# Patient Record
Sex: MALE | Race: ASIAN | Employment: FULL TIME | URBAN - METROPOLITAN AREA
[De-identification: names, ages, dates, MRNs, and addresses within clinical notes are randomized per-mention and may not be internally consistent; named-entity substitution may affect disease eponyms.]

---

## 2017-06-15 ENCOUNTER — OFFICE VISIT - HEALTHEAST (OUTPATIENT)
Dept: FAMILY MEDICINE | Facility: CLINIC | Age: 42
End: 2017-06-15

## 2017-06-15 ENCOUNTER — RECORDS - HEALTHEAST (OUTPATIENT)
Dept: GENERAL RADIOLOGY | Age: 42
End: 2017-06-15

## 2017-06-15 DIAGNOSIS — M25.512 PAIN IN LEFT SHOULDER: ICD-10-CM

## 2017-06-15 DIAGNOSIS — M25.512 ACUTE PAIN OF LEFT SHOULDER: ICD-10-CM

## 2017-06-19 ENCOUNTER — COMMUNICATION - HEALTHEAST (OUTPATIENT)
Dept: SCHEDULING | Facility: CLINIC | Age: 42
End: 2017-06-19

## 2017-06-20 ENCOUNTER — COMMUNICATION - HEALTHEAST (OUTPATIENT)
Dept: INTERNAL MEDICINE | Facility: CLINIC | Age: 42
End: 2017-06-20

## 2020-03-19 ENCOUNTER — HOSPITAL ENCOUNTER (INPATIENT)
Facility: CLINIC | Age: 45
LOS: 6 days | Discharge: HOME | DRG: 392 | End: 2020-03-26
Attending: EMERGENCY MEDICINE | Admitting: HOSPITALIST
Payer: COMMERCIAL

## 2020-03-19 ENCOUNTER — APPOINTMENT (OUTPATIENT)
Dept: CT IMAGING | Facility: CLINIC | Age: 45
DRG: 392 | End: 2020-03-19
Attending: EMERGENCY MEDICINE
Payer: COMMERCIAL

## 2020-03-19 DIAGNOSIS — K57.32 DIVERTICULITIS OF COLON: ICD-10-CM

## 2020-03-19 DIAGNOSIS — K57.92 DIVERTICULITIS: Primary | ICD-10-CM

## 2020-03-19 LAB
ALBUMIN SERPL-MCNC: 3.8 G/DL (ref 3.4–5)
ALBUMIN UR-MCNC: NEGATIVE MG/DL
ALP SERPL-CCNC: 85 U/L (ref 40–150)
ALT SERPL W P-5'-P-CCNC: 43 U/L (ref 0–70)
ANION GAP SERPL CALCULATED.3IONS-SCNC: 5 MMOL/L (ref 3–14)
APPEARANCE UR: CLEAR
AST SERPL W P-5'-P-CCNC: 28 U/L (ref 0–45)
BASOPHILS # BLD AUTO: 0 10E9/L (ref 0–0.2)
BASOPHILS NFR BLD AUTO: 0 %
BILIRUB SERPL-MCNC: 0.8 MG/DL (ref 0.2–1.3)
BILIRUB UR QL STRIP: NEGATIVE
BUN SERPL-MCNC: 16 MG/DL (ref 7–30)
CALCIUM SERPL-MCNC: 9.7 MG/DL (ref 8.5–10.1)
CHLORIDE SERPL-SCNC: 109 MMOL/L (ref 94–109)
CO2 SERPL-SCNC: 26 MMOL/L (ref 20–32)
COLOR UR AUTO: YELLOW
CREAT SERPL-MCNC: 1.29 MG/DL (ref 0.66–1.25)
DIFFERENTIAL METHOD BLD: ABNORMAL
EOSINOPHIL # BLD AUTO: 0 10E9/L (ref 0–0.7)
EOSINOPHIL NFR BLD AUTO: 0 %
ERYTHROCYTE [DISTWIDTH] IN BLOOD BY AUTOMATED COUNT: 13.2 % (ref 10–15)
GFR SERPL CREATININE-BSD FRML MDRD: 67 ML/MIN/{1.73_M2}
GLUCOSE SERPL-MCNC: 137 MG/DL (ref 70–99)
GLUCOSE UR STRIP-MCNC: NEGATIVE MG/DL
HCT VFR BLD AUTO: 46.2 % (ref 40–53)
HGB BLD-MCNC: 15.7 G/DL (ref 13.3–17.7)
HGB UR QL STRIP: NEGATIVE
IMM GRANULOCYTES # BLD: 0 10E9/L (ref 0–0.4)
IMM GRANULOCYTES NFR BLD: 0.2 %
KETONES UR STRIP-MCNC: 10 MG/DL
LACTATE BLD-SCNC: 1.2 MMOL/L (ref 0.7–2)
LACTATE BLD-SCNC: 1.3 MMOL/L (ref 0.7–2)
LEUKOCYTE ESTERASE UR QL STRIP: NEGATIVE
LIPASE SERPL-CCNC: 143 U/L (ref 73–393)
LYMPHOCYTES # BLD AUTO: 1.1 10E9/L (ref 0.8–5.3)
LYMPHOCYTES NFR BLD AUTO: 10.8 %
MCH RBC QN AUTO: 30.1 PG (ref 26.5–33)
MCHC RBC AUTO-ENTMCNC: 34 G/DL (ref 31.5–36.5)
MCV RBC AUTO: 89 FL (ref 78–100)
MONOCYTES # BLD AUTO: 0.5 10E9/L (ref 0–1.3)
MONOCYTES NFR BLD AUTO: 5.2 %
MUCOUS THREADS #/AREA URNS LPF: PRESENT /LPF
NEUTROPHILS # BLD AUTO: 8.5 10E9/L (ref 1.6–8.3)
NEUTROPHILS NFR BLD AUTO: 83.8 %
NITRATE UR QL: NEGATIVE
PH UR STRIP: 6 PH (ref 5–7)
PLATELET # BLD AUTO: 199 10E9/L (ref 150–450)
POTASSIUM SERPL-SCNC: 3.5 MMOL/L (ref 3.4–5.3)
PROT SERPL-MCNC: 7.7 G/DL (ref 6.8–8.8)
RBC # BLD AUTO: 5.22 10E12/L (ref 4.4–5.9)
RBC #/AREA URNS AUTO: <1 /HPF (ref 0–2)
SODIUM SERPL-SCNC: 140 MMOL/L (ref 133–144)
SOURCE: ABNORMAL
SP GR UR STRIP: 1.03 (ref 1–1.03)
UROBILINOGEN UR STRIP-MCNC: NORMAL MG/DL (ref 0–2)
WBC # BLD AUTO: 10.2 10E9/L (ref 4–11)
WBC #/AREA URNS AUTO: <1 /HPF (ref 0–5)

## 2020-03-19 PROCEDURE — 25000128 H RX IP 250 OP 636: Performed by: HOSPITALIST

## 2020-03-19 PROCEDURE — 96375 TX/PRO/DX INJ NEW DRUG ADDON: CPT

## 2020-03-19 PROCEDURE — 25800030 ZZH RX IP 258 OP 636: Performed by: HOSPITALIST

## 2020-03-19 PROCEDURE — G0378 HOSPITAL OBSERVATION PER HR: HCPCS

## 2020-03-19 PROCEDURE — 25000128 H RX IP 250 OP 636: Performed by: EMERGENCY MEDICINE

## 2020-03-19 PROCEDURE — 80053 COMPREHEN METABOLIC PANEL: CPT | Performed by: EMERGENCY MEDICINE

## 2020-03-19 PROCEDURE — 36415 COLL VENOUS BLD VENIPUNCTURE: CPT | Performed by: HOSPITALIST

## 2020-03-19 PROCEDURE — 25000128 H RX IP 250 OP 636

## 2020-03-19 PROCEDURE — 83605 ASSAY OF LACTIC ACID: CPT | Performed by: HOSPITALIST

## 2020-03-19 PROCEDURE — 83605 ASSAY OF LACTIC ACID: CPT | Performed by: EMERGENCY MEDICINE

## 2020-03-19 PROCEDURE — 81001 URINALYSIS AUTO W/SCOPE: CPT | Performed by: EMERGENCY MEDICINE

## 2020-03-19 PROCEDURE — 99285 EMERGENCY DEPT VISIT HI MDM: CPT | Mod: 25

## 2020-03-19 PROCEDURE — 25000132 ZZH RX MED GY IP 250 OP 250 PS 637: Performed by: HOSPITALIST

## 2020-03-19 PROCEDURE — 99220 ZZC INITIAL OBSERVATION CARE,LEVL III: CPT | Performed by: HOSPITALIST

## 2020-03-19 PROCEDURE — 74177 CT ABD & PELVIS W/CONTRAST: CPT

## 2020-03-19 PROCEDURE — 25000125 ZZHC RX 250: Performed by: EMERGENCY MEDICINE

## 2020-03-19 PROCEDURE — 96365 THER/PROPH/DIAG IV INF INIT: CPT | Mod: 59

## 2020-03-19 PROCEDURE — 12000000 ZZH R&B MED SURG/OB

## 2020-03-19 PROCEDURE — 85025 COMPLETE CBC W/AUTO DIFF WBC: CPT | Performed by: EMERGENCY MEDICINE

## 2020-03-19 PROCEDURE — 25800030 ZZH RX IP 258 OP 636: Performed by: PHYSICIAN ASSISTANT

## 2020-03-19 PROCEDURE — 83690 ASSAY OF LIPASE: CPT | Performed by: EMERGENCY MEDICINE

## 2020-03-19 PROCEDURE — 96376 TX/PRO/DX INJ SAME DRUG ADON: CPT

## 2020-03-19 RX ORDER — HYDROMORPHONE HYDROCHLORIDE 1 MG/ML
0.5 INJECTION, SOLUTION INTRAMUSCULAR; INTRAVENOUS; SUBCUTANEOUS
Status: DISCONTINUED | OUTPATIENT
Start: 2020-03-19 | End: 2020-03-19

## 2020-03-19 RX ORDER — NALOXONE HYDROCHLORIDE 0.4 MG/ML
.1-.4 INJECTION, SOLUTION INTRAMUSCULAR; INTRAVENOUS; SUBCUTANEOUS
Status: DISCONTINUED | OUTPATIENT
Start: 2020-03-19 | End: 2020-03-26 | Stop reason: HOSPADM

## 2020-03-19 RX ORDER — HYDROMORPHONE HYDROCHLORIDE 1 MG/ML
0.3 INJECTION, SOLUTION INTRAMUSCULAR; INTRAVENOUS; SUBCUTANEOUS
Status: DISCONTINUED | OUTPATIENT
Start: 2020-03-19 | End: 2020-03-26 | Stop reason: HOSPADM

## 2020-03-19 RX ORDER — PIPERACILLIN SODIUM, TAZOBACTAM SODIUM 3; .375 G/15ML; G/15ML
3.38 INJECTION, POWDER, LYOPHILIZED, FOR SOLUTION INTRAVENOUS ONCE
Status: COMPLETED | OUTPATIENT
Start: 2020-03-19 | End: 2020-03-19

## 2020-03-19 RX ORDER — ONDANSETRON 2 MG/ML
4 INJECTION INTRAMUSCULAR; INTRAVENOUS EVERY 6 HOURS PRN
Status: DISCONTINUED | OUTPATIENT
Start: 2020-03-19 | End: 2020-03-26 | Stop reason: HOSPADM

## 2020-03-19 RX ORDER — HYDROCODONE BITARTRATE AND ACETAMINOPHEN 5; 325 MG/1; MG/1
1-2 TABLET ORAL EVERY 4 HOURS PRN
Status: DISCONTINUED | OUTPATIENT
Start: 2020-03-19 | End: 2020-03-26 | Stop reason: HOSPADM

## 2020-03-19 RX ORDER — CEFTRIAXONE 1 G/1
1 INJECTION, POWDER, FOR SOLUTION INTRAMUSCULAR; INTRAVENOUS EVERY 24 HOURS
Status: DISCONTINUED | OUTPATIENT
Start: 2020-03-19 | End: 2020-03-26 | Stop reason: HOSPADM

## 2020-03-19 RX ORDER — IOPAMIDOL 755 MG/ML
71 INJECTION, SOLUTION INTRAVASCULAR ONCE
Status: COMPLETED | OUTPATIENT
Start: 2020-03-19 | End: 2020-03-19

## 2020-03-19 RX ORDER — ACETAMINOPHEN 325 MG/1
650 TABLET ORAL EVERY 4 HOURS PRN
Status: DISCONTINUED | OUTPATIENT
Start: 2020-03-19 | End: 2020-03-26 | Stop reason: HOSPADM

## 2020-03-19 RX ORDER — HYDROMORPHONE HYDROCHLORIDE 1 MG/ML
INJECTION, SOLUTION INTRAMUSCULAR; INTRAVENOUS; SUBCUTANEOUS
Status: COMPLETED
Start: 2020-03-19 | End: 2020-03-19

## 2020-03-19 RX ORDER — SODIUM CHLORIDE, SODIUM LACTATE, POTASSIUM CHLORIDE, CALCIUM CHLORIDE 600; 310; 30; 20 MG/100ML; MG/100ML; MG/100ML; MG/100ML
INJECTION, SOLUTION INTRAVENOUS CONTINUOUS
Status: DISCONTINUED | OUTPATIENT
Start: 2020-03-19 | End: 2020-03-26 | Stop reason: HOSPADM

## 2020-03-19 RX ORDER — ONDANSETRON 4 MG/1
4 TABLET, ORALLY DISINTEGRATING ORAL EVERY 6 HOURS PRN
Status: DISCONTINUED | OUTPATIENT
Start: 2020-03-19 | End: 2020-03-26 | Stop reason: HOSPADM

## 2020-03-19 RX ORDER — ACETAMINOPHEN 650 MG/1
650 SUPPOSITORY RECTAL EVERY 4 HOURS PRN
Status: DISCONTINUED | OUTPATIENT
Start: 2020-03-19 | End: 2020-03-26 | Stop reason: HOSPADM

## 2020-03-19 RX ADMIN — SODIUM CHLORIDE, POTASSIUM CHLORIDE, SODIUM LACTATE AND CALCIUM CHLORIDE: 600; 310; 30; 20 INJECTION, SOLUTION INTRAVENOUS at 14:54

## 2020-03-19 RX ADMIN — METRONIDAZOLE 500 MG: 500 INJECTION, SOLUTION INTRAVENOUS at 20:18

## 2020-03-19 RX ADMIN — METRONIDAZOLE 500 MG: 500 INJECTION, SOLUTION INTRAVENOUS at 11:59

## 2020-03-19 RX ADMIN — METRONIDAZOLE 500 MG: 500 INJECTION, SOLUTION INTRAVENOUS at 05:24

## 2020-03-19 RX ADMIN — IOPAMIDOL 71 ML: 755 INJECTION, SOLUTION INTRAVENOUS at 01:41

## 2020-03-19 RX ADMIN — ACETAMINOPHEN 650 MG: 325 TABLET, FILM COATED ORAL at 18:42

## 2020-03-19 RX ADMIN — HYDROMORPHONE HYDROCHLORIDE 0.5 MG: 1 INJECTION, SOLUTION INTRAMUSCULAR; INTRAVENOUS; SUBCUTANEOUS at 01:11

## 2020-03-19 RX ADMIN — ACETAMINOPHEN 650 MG: 325 TABLET, FILM COATED ORAL at 11:13

## 2020-03-19 RX ADMIN — SODIUM CHLORIDE 1000 ML: 9 INJECTION, SOLUTION INTRAVENOUS at 03:51

## 2020-03-19 RX ADMIN — CEFTRIAXONE SODIUM 1 G: 1 INJECTION, POWDER, FOR SOLUTION INTRAMUSCULAR; INTRAVENOUS at 04:52

## 2020-03-19 RX ADMIN — HYDROCODONE BITARTRATE AND ACETAMINOPHEN 1 TABLET: 5; 325 TABLET ORAL at 14:53

## 2020-03-19 RX ADMIN — SODIUM CHLORIDE 60 ML: 9 INJECTION, SOLUTION INTRAVENOUS at 01:41

## 2020-03-19 RX ADMIN — HYDROCODONE BITARTRATE AND ACETAMINOPHEN 1 TABLET: 5; 325 TABLET ORAL at 03:56

## 2020-03-19 RX ADMIN — PIPERACILLIN SODIUM AND TAZOBACTAM SODIUM 3.38 G: 3; .375 INJECTION, POWDER, LYOPHILIZED, FOR SOLUTION INTRAVENOUS at 02:24

## 2020-03-19 RX ADMIN — HYDROCODONE BITARTRATE AND ACETAMINOPHEN 2 TABLET: 5; 325 TABLET ORAL at 18:41

## 2020-03-19 ASSESSMENT — MIFFLIN-ST. JEOR
SCORE: 1456.9
SCORE: 1467.79

## 2020-03-19 ASSESSMENT — ENCOUNTER SYMPTOMS
DIARRHEA: 0
NAUSEA: 0
VOMITING: 0
BACK PAIN: 0
ABDOMINAL PAIN: 1

## 2020-03-19 NOTE — ED PROVIDER NOTES
"  History     Chief Complaint:  Abdominal Pain      HPI   Elier Godinez is a 45 year old male who presents via EMS with abdominal pain. The patient says that he has been having constant abdominal pain since 1900. He rates the pain as a 8/10. EMS reports giving the patient Fentanyl. The patient says that the pain feels similar to when he had appendicitis in the past. He denies any nausea, vomiting, diarrhea or back pain.     Allergies:  No Known Drug Allergies    Medications:    Medications reviewed. No current medications.     Past Medical History:    Medical history reviewed. No pertinent medical history.    Past Surgical History:    Appendectomy     Family History:    Family history reviewed. No pertinent family history.     Social History:  PCP: Thom Jiménez   Marital Status:  Single      Review of Systems   Gastrointestinal: Positive for abdominal pain. Negative for diarrhea, nausea and vomiting.   Musculoskeletal: Negative for back pain.   10 point review of systems performed and is negative except as above and in HPI.       Physical Exam     Patient Vitals for the past 24 hrs:   BP Temp Temp src Pulse Heart Rate Resp SpO2 Height Weight   03/19/20 0441 -- -- -- -- -- 20 -- -- --   03/19/20 0341 -- -- -- -- -- -- -- -- 62.4 kg (137 lb 9.6 oz)   03/19/20 0340 102/61 99.3  F (37.4  C) Oral 100 -- 20 94 % -- --   03/19/20 0230 114/85 -- -- 103 104 -- -- -- --   03/19/20 0201 120/88 -- -- 109 108 28 96 % -- --   03/19/20 0200 120/88 -- -- 109 109 22 96 % -- --   03/19/20 0130 113/79 -- -- 102 104 21 96 % -- --   03/19/20 0100 -- -- -- -- 73 -- 99 % -- --   03/19/20 0057 117/79 98.3  F (36.8  C) Oral 71 -- 18 99 % 1.676 m (5' 6\") 63.5 kg (140 lb)   03/19/20 0056 117/79 98.5  F (36.9  C) Oral 73 -- -- 99 % -- --        Physical Exam  General: Resting on the gurney, appears  uncomfortable  Head:  The scalp, face, and head appear normal  Mouth/Throat: Mucus membranes are moist  CV:  Regular rate    Normal S1 and " S2  No pathological murmur   Resp:  Breath sounds clear and equal bilaterally    Non-labored, no retractions or accessory muscle use    No coarseness    No wheezing   GI:  Abdomen is soft, no rigidity    diffuse tenderness throughout. Voluntary guarding, rebound present, pain worse in lower abdomen.  MS:  Normal motor assessment of all extremities.    Good capillary refill noted.      Skin:   No rash or lesions noted.  Neuro:  Speech is normal and fluent. No apparent deficit.  Psych:  Awake. Alert.  Normal affect.      Appropriate interactions.   Emergency Department Course     Imaging:  Radiology findings were communicated with the patient who voiced understanding of the findings.    CT Abdomen Pelvis w Contrast  1.  Evidence for acute sigmoid diverticulitis with microperforation. No abscess at this time. Adjacent loops of small bowel likely secondarily inflamed. No obstruction. If not performed per routine screening guidelines, GI or surgical consult recommended   to consider colonoscopy and exclude other underlying pathology.  Reading per radiology    Laboratory:  Laboratory findings were communicated with the patient who voiced understanding of the findings.    UA: ketone 10 (A), mucous present (A) o/w WNL     Lactic Acid (Resulted: 0118): 1.2   CBC: WBC 10.2, HGB 15.7,   CMP:  (H), creatinine 1.29 (H) o/w WNL  Lipase: 143    Interventions:  0111 Dilaudid 0.5 mg IV   0224 Zosyn 3.375 g IV     Emergency Department Course:    0055 Nursing notes and vitals reviewed. I performed an exam of the patient as documented above.     0105 IV was inserted and blood was drawn for laboratory testing, results above.     0149 The patient was sent for a CT while in the emergency department, results above.      0204 I spoke with Radiology regarding patient's presentation, findings, and plan of care.     0226 The patient provided a urine sample here in the emergency department. This was sent for laboratory testing,  findings above.     0251 I spoke with Dr. Holland of the hospitalist service regarding patient's presentation, findings, and plan of care.      Patient rechecked and updated.       The patient is admitted into the care of Dr. Holland.     Impression & Plan      Medical Decision Making:  Elier Godinez is a 45 year old male presented with abdominal pain and CT confirms diverticulitis with microperforation; this appears consistent with the history and physical exam so I doubt another underlying etiology is also present.  The patient's pain has been controlled by interventions in the emergency department.  There are no signs of sepsis, shock or bacteremia at this time.  Antibiotics were started here in the ED. I discussed the patient with the hospitalist and they agreed for admission to an observation bed for further pain management and evaluation.     Diagnosis:    ICD-10-CM    1. Diverticulitis of colon  K57.32 UA with Microscopic     Disposition:   Findings and plan explained to the Patient who consents to admission. Discussed the patient with Dr. Holland, who will admit the patient for further monitoring, evaluation, and treatment.       Scribe Disclosure:  REX, Garrick Richards, am serving as a scribe at 12:57 AM on 3/19/2020 to document services personally performed by Amie De Jesus MD based on my observations and the provider's statements to me.       EMERGENCY DEPARTMENT       Amie De Jesus MD  03/19/20 8715

## 2020-03-19 NOTE — PROGRESS NOTES
Observation Goals      -diagnostic tests and consults completed and resulted - not met, possible GI consult if not tolerating food   -vital signs normal or at patient baseline - met   -tolerating oral intake to maintain hydration - partially met - Full liquid diet   -adequate pain control on oral analgesics - met with PRNs   Nurse to notify provider when observation goals have been met and patient is ready for discharge.

## 2020-03-19 NOTE — PROGRESS NOTES
RECEIVING UNIT ED HANDOFF REVIEW    ED Nurse Handoff Report was reviewed by: Elaine Munoz RN on March 19, 2020 at 3:17 AM

## 2020-03-19 NOTE — H&P
[facility]     History and Physical - Hospitalist Service         Name: Elier Godinez    MRN: [id number]  YOB: 1975    Age: 45 year old  Date of Admission:  3/19/2020  Physician: Dorina Holland MD  Text Page        Assessment & Plan   Elier Godinez is a 45 year old male with PMH chronic latent hep B who presents on 03/19/20  with acute diverticulitis with microperforation.    Acute sigmoid diverticulitis with microperforation: no abscess noted  - Received IV Zosyn x 1 in ED. Will continue with Ceftriaxone and flagyl.   - IV hydration  - pain control  - clear diet. ADAT  - GI inpatient consult if pt does not improve as expected, otherwise f/u outpatient    Acute kidney injury: presumed 2/2 dehydration, poor po intake  - IV fluids  - avoid nephrotoxins    Chronic latent HBV: presumed vertical transmission. Recently diagnosed. Follows with GI.  - continue outpatient follow up    Diet: Orders Placed This Encounter      Advance Diet as Tolerated: Clear Liquid Diet     DVT Prophylaxis: Pneumatic Compression Devices  Code Status: Full Code  Aldana Catheter: not present      Disposition Plan    Expected discharge: Tomorrow, recommended to prior living arrangement once adequate pain management/ tolerating PO medications and antibiotic plan established.    Dorina Holland MD  03/19/2020 2:54 AM       Primary Care Physician   *Thom Jiménez, [phone]    Chief Complaint   Severe abdominal pain    History is obtained from the patient.  I also spoke with the ER provider about the history.       History of Present Illness   Elier Godinez is a 45 year old male who presents with abdominal pain that began at 7pm yesterday evening. 9/10 in intensity when it started, intermittent, but frequent. Currently 2/10 after pain medications in the ED. Last po intake was lunch - [employer]. He has had occasional pain of this type before, but not to this degree. Denies fevers or chills. No nausea, vomiting  or diarrhea.    Past Medical History    Chronic hepatitis B    Past Surgical History   Lipoma excisions  Appendectomy  Left hip surgery    Prior to Admission Medications   Occasional ibuprofen    Allergies   No Known Allergies    Social History   Vapes occasionally. Denies alcohol or other drug use.    Family History   Mother has Hepatitis B    Review of Systems   A Comprehensive greater than 10 system review of systems was carried out.  Pertinent positives and negatives are noted above.  Otherwise negative for contributory information.    Physical Exam   Temp: 98.3  F (36.8  C) Temp src: Oral BP: 114/85 Pulse: 103 Heart Rate: 104 Resp: 28 SpO2: 96 % O2 Device: None (Room air)    Vital Signs with Ranges  Temp:  [98.3  F (36.8  C)-98.5  F (36.9  C)] 98.3  F (36.8  C)  Pulse:  [] 103  Heart Rate:  [] 104  Resp:  [18-28] 28  BP: (113-120)/(79-88) 114/85  SpO2:  [96 %-99 %] 96 %  140 lbs 0 oz    GEN:  Alert, oriented x 3, appears comfortable, no overt distress  HEENT:  Normocephalic/atraumatic, no scleral icterus, no nasal discharge, mouth moist.  CV:  Tachycardic  LUNGS:  Clear to auscultation bilaterally without rales/rhonchi/wheezing/retractions.  Symmetric chest rise on inhalation noted.  ABD:  Soft. Tender to palp  EXT:  No edema.  No cyanosis.  No joint synovitis noted.  SKIN:  Dry to touch, no exanthems noted in the visualized areas.  NEURO:  Symmetric muscle strength, sensation to touch grossly intact.  Coordination symmetric on general exam.  No new focal deficits appreciated.    Data   Data reviewed today:  I personally reviewed no images or EKG's today.    Lab Results   Component Value Date    WBC 10.2 03/19/2020    HGB 15.7 03/19/2020    HCT 46.2 03/19/2020     03/19/2020     03/19/2020    POTASSIUM 3.5 03/19/2020    CHLORIDE 109 03/19/2020    CO2 26 03/19/2020    BUN 16 03/19/2020    CR 1.29 (H) 03/19/2020     (H) 03/19/2020    AST 28 03/19/2020    ALT 43 03/19/2020     ALKPHOS 85 03/19/2020    BILITOTAL 0.8 03/19/2020        Recent Results (from the past 24 hour(s))   CT Abdomen Pelvis w Contrast    Narrative    EXAM: CT ABDOMEN PELVIS W CONTRAST  LOCATION: [facility]  DATE/TIME: 3/19/2020 1:49 AM    INDICATION: Abdominal pain with rebound tenderness.  COMPARISON: None.  TECHNIQUE: CT scan of the abdomen and pelvis was performed following injection of IV contrast. Multiplanar reformats were obtained. Dose reduction techniques were used.  CONTRAST: 71 mL Isovue-370.    FINDINGS:   LOWER CHEST: Normal.    HEPATOBILIARY: Normal.    PANCREAS: Normal.    SPLEEN: Normal.    ADRENAL GLANDS: Normal.    KIDNEYS/BLADDER: Tiny renal lesions too small to characterize definitively though likely benign.    BOWEL: Diverticulosis with sigmoid colon wall thickening, adjacent inflammatory change and small bubbles of extraluminal gas consistent with perforated diverticulitis. Inflammatory changes involve adjacent loops of small bowel. No significant abscess at   this time. No bowel obstruction.    LYMPH NODES: Normal.    VASCULATURE: Unremarkable.    PELVIC ORGANS: Normal.    MUSCULOSKELETAL: Tiny fat-containing paraumbilical hernia. Degenerative disease. Presumed bone islands.      Impression    IMPRESSION:   1.  Evidence for acute sigmoid diverticulitis with microperforation. No abscess at this time. Adjacent loops of small bowel likely secondarily inflamed. No obstruction. If not performed per routine screening guidelines, GI or surgical consult recommended   to consider colonoscopy and exclude other underlying pathology.    Results called to Amie De Jesus at 2:04 AM.

## 2020-03-19 NOTE — PROGRESS NOTES
[facility]    Medicine Progress Note - Hospitalist Service       Date of Admission:  3/19/2020  Assessment & Plan   Elier Godinez is a 45 year old male with PMH chronic latent hep B who presents on 03/19/20 with acute diverticulitis with microperforation.     Acute sigmoid diverticulitis with microperforation:   Presented with abd pain x1 day PTA. No N/V/D. No fevers chills. Found to have diverticulitis on imaging. Denies prior hx of diverticulitis per pt. CT with acute sigmoid diverticulitis with microperforation. No abscess noted.   - Received IV Zosyn x 1 in ED. Will continue with Ceftriaxone and flagyl.   - IV hydration  - Pain control  - Clear diet, ADAT  - GI inpatient consult if pt does not improve within 24' as expected, otherwise f/u outpatient     Acute kidney injury  Cr 1.29  - Presumed 2/2 dehydration, poor po intake.   - IV fluids and avoid nephrotoxins  - UA neg  - Repeat BMP in AM to follow up in AM      Chronic latent HBV  Presumed vertical transmission. Recently diagnosed.   - Follows with GI.   - Continue outpatient follow up.    Diet: Advance Diet as Tolerated: Full Liquid Diet    DVT Prophylaxis: Ambulate every shift  Aldana Catheter: not present  Code Status: Full Code      Disposition Plan   Expected discharge: Tomorrow, recommended to prior living arrangement once adequate pain management/ tolerating PO medications, antibiotic plan established, renal function improved and clinically improved.  Entered: Wanda Calvert PA-C 03/19/2020, 2:21 PM       The patient's care was discussed with the Attending Physician, Dr. Maria, Bedside Nurse, Patient and Gastroenterology Consultant.    Wanda Calvert PA-C ([place])  Hospitalist Service  [facility]    ______________________________________________________________________    Interval History   Seen and examined. Denies N/V, taking minimal PO. Pain remains 5/10, TTP to abd worse on Rt. No fevers or chills.    Data  reviewed today: I reviewed all medications, new labs and imaging results over the last 24 hours. I personally reviewed no images or EKG's today.    Physical Exam   Vital Signs: Temp: 98.5  F (36.9  C) Temp src: Oral BP: 101/64 Pulse: 96 Heart Rate: 104 Resp: 18 SpO2: 96 % O2 Device: None (Room air)    Weight: 137 lbs 9.6 oz    General: Awake, alert, middle aged male who appears stated age. Looks comfortable laying in bed. No acute distress.  HEENT: Normocephalic, atraumatic. Extraocular movements intact.   Respiratory: Clear to auscultation bilaterally, no rales, wheezing, or rhonchi.  Cardiovascular: Regular rate and rhythm, +S1 and S2, no murmur auscultated. No peripheral edema.   Gastrointestinal: Soft, non-distended. No guarding. Bowel sounds present. TTP diffusely, Lt side of abd more than Rt. Prior appy surgical scar noted.  Skin: Warm, dry. No obvious rashes or lesions on exposed skin. Dorsalis pedis pulses palpable bilaterally.  Musculoskeletal: No joint swelling, erythema or tenderness. Moves all extremities equally.  Neurologic: AAO x3. Cranial nerves 2-12 grossly intact, normal strength and sensation.  Psychiatric: Appropriate mood and affect. No obvious anxiety or depression.    Data   Recent Labs   Lab 03/19/20  0105   WBC 10.2   HGB 15.7   MCV 89         POTASSIUM 3.5   CHLORIDE 109   CO2 26   BUN 16   CR 1.29*   ANIONGAP 5   GALDINO 9.7   *   ALBUMIN 3.8   PROTTOTAL 7.7   BILITOTAL 0.8   ALKPHOS 85   ALT 43   AST 28   LIPASE 143     Recent Results (from the past 24 hour(s))   CT Abdomen Pelvis w Contrast    Narrative    EXAM: CT ABDOMEN PELVIS W CONTRAST  LOCATION: [facility]  DATE/TIME: 3/19/2020 1:49 AM    INDICATION: Abdominal pain with rebound tenderness.  COMPARISON: None.  TECHNIQUE: CT scan of the abdomen and pelvis was performed following injection of IV contrast. Multiplanar reformats were obtained. Dose reduction techniques were used.  CONTRAST: 71 mL  Isovue-370.    FINDINGS:   LOWER CHEST: Normal.    HEPATOBILIARY: Normal.    PANCREAS: Normal.    SPLEEN: Normal.    ADRENAL GLANDS: Normal.    KIDNEYS/BLADDER: Tiny renal lesions too small to characterize definitively though likely benign.    BOWEL: Diverticulosis with sigmoid colon wall thickening, adjacent inflammatory change and small bubbles of extraluminal gas consistent with perforated diverticulitis. Inflammatory changes involve adjacent loops of small bowel. No significant abscess at   this time. No bowel obstruction.    LYMPH NODES: Normal.    VASCULATURE: Unremarkable.    PELVIC ORGANS: Normal.    MUSCULOSKELETAL: Tiny fat-containing paraumbilical hernia. Degenerative disease. Presumed bone islands.      Impression    IMPRESSION:   1.  Evidence for acute sigmoid diverticulitis with microperforation. No abscess at this time. Adjacent loops of small bowel likely secondarily inflamed. No obstruction. If not performed per routine screening guidelines, GI or surgical consult recommended   to consider colonoscopy and exclude other underlying pathology.    Results called to Amie De Jesus at 2:04 AM.       Medications     lactated ringers         cefTRIAXone  1 g Intravenous Q24H     metroNIDAZOLE  500 mg Intravenous Q8H

## 2020-03-19 NOTE — PLAN OF CARE
Observation Goals     -diagnostic tests and consults completed and resulted - not met  -vital signs normal or at patient baseline - not met  -tolerating oral intake to maintain hydration - partially met  -adequate pain control on oral analgesics - met with norco  Nurse to notify provider when observation goals have been met and patient is ready for discharge.         Pt arrived on unit around 0345 on 3/19/20. A/Ox4. Ind. VSS ex soft BP. 1000 mL NS bolus ordered. Has generalized abdominal pain that is tender when palpated. BS are active. Norco given to control pain. Receiving Rocephin and Flagyl. Clear liquid diet, advance as matthew.

## 2020-03-19 NOTE — ED NOTES
Bed: ED21  Expected date: 3/19/20  Expected time: 12:45 AM  Means of arrival: Ambulance  Comments:  Carol 536 45M Abd. pain

## 2020-03-19 NOTE — PHARMACY-ADMISSION MEDICATION HISTORY
Pharmacy Medication History  Admission medication history interview status for the 3/19/2020  admission is complete. See EPIC admission navigator for prior to admission medications     Medication history sources: Patient  Medication history source reliability: Good  Adherence assessment: N/A    Significant changes made to the medication list:  None      Additional medication history information:   Patient takes a multivitamin occasionally but it's been over a month since his last dose    Medication reconciliation completed by provider prior to medication history? No    Time spent in this activity: 5 minutes      Prior to Admission medications    Not on File

## 2020-03-19 NOTE — ED NOTES
"[facility]  ED Nurse Handoff Report    ED Chief complaint: Abdominal Pain      ED Diagnosis:   Final diagnoses:   Diverticulitis of colon       Code Status: Full Code(Admitting provider to determine)    Allergies: Allergies no known allergies    Patient Story: Pt come to the emergency room due to increase pain that started at 1900 yesterday and gradually increase to unbearable pain.   Focused Assessment:  Patient in fetal position, guarding his abdomen, c/o of lower quadrant pain. Rating pain level 85/10.   Treatments and/or interventions provided: Dilaudid one dose given, CT of abdomen completed  Patient's response to treatments and/or interventions: Tolerating well, pt rating pain 3/10.     To be done/followed up on inpatient unit:      Does this patient have any cognitive concerns?: Pt awake, alert and orienatated X3    Activity level - Baseline/Home:  Independent  Activity Level - Current:   Independent    Patient's Preferred language: English   Needed?: No    Isolation: None  Infection: Not Applicable  Bariatric?: No    Vital Signs:   Vitals:    03/19/20 0057 03/19/20 0100 03/19/20 0130 03/19/20 0200   BP: 117/79  113/79 120/88   Pulse: 71  102 109   Resp: 18  21 22   Temp: 98.3  F (36.8  C)      TempSrc: Oral      SpO2: 99% 99% 96% 96%   Weight: 63.5 kg (140 lb)      Height: 1.676 m (5' 6\")          Cardiac Rhythm:     Was the PSS-3 completed:   Yes  What interventions are required if any?               Family Comments: None   OBS brochure/video discussed/provided to patient/family: No              Name of person given brochure if not patient:               Relationship to patient:    For the majority of the shift this patient's behavior was Green.   Behavioral interventions performed were None.    ED NURSE PHONE NUMBER: [phone]         "

## 2020-03-20 ENCOUNTER — APPOINTMENT (OUTPATIENT)
Dept: CT IMAGING | Facility: CLINIC | Age: 45
DRG: 392 | End: 2020-03-20
Attending: HOSPITALIST
Payer: COMMERCIAL

## 2020-03-20 PROBLEM — K56.609 SMALL BOWEL OBSTRUCTION (H): Status: ACTIVE | Noted: 2020-03-20

## 2020-03-20 LAB
ANION GAP SERPL CALCULATED.3IONS-SCNC: 4 MMOL/L (ref 3–14)
BUN SERPL-MCNC: 13 MG/DL (ref 7–30)
CALCIUM SERPL-MCNC: 8.4 MG/DL (ref 8.5–10.1)
CHLORIDE SERPL-SCNC: 112 MMOL/L (ref 94–109)
CO2 SERPL-SCNC: 24 MMOL/L (ref 20–32)
CREAT SERPL-MCNC: 1.24 MG/DL (ref 0.66–1.25)
ERYTHROCYTE [DISTWIDTH] IN BLOOD BY AUTOMATED COUNT: 13.7 % (ref 10–15)
GFR SERPL CREATININE-BSD FRML MDRD: 70 ML/MIN/{1.73_M2}
GLUCOSE SERPL-MCNC: 115 MG/DL (ref 70–99)
HCT VFR BLD AUTO: 38.1 % (ref 40–53)
HGB BLD-MCNC: 12.6 G/DL (ref 13.3–17.7)
LACTATE BLD-SCNC: 1.1 MMOL/L (ref 0.7–2)
MCH RBC QN AUTO: 30.8 PG (ref 26.5–33)
MCHC RBC AUTO-ENTMCNC: 33.1 G/DL (ref 31.5–36.5)
MCV RBC AUTO: 93 FL (ref 78–100)
PLATELET # BLD AUTO: 148 10E9/L (ref 150–450)
POTASSIUM SERPL-SCNC: 3.4 MMOL/L (ref 3.4–5.3)
RBC # BLD AUTO: 4.09 10E12/L (ref 4.4–5.9)
SODIUM SERPL-SCNC: 140 MMOL/L (ref 133–144)
WBC # BLD AUTO: 16.1 10E9/L (ref 4–11)

## 2020-03-20 PROCEDURE — 12000000 ZZH R&B MED SURG/OB

## 2020-03-20 PROCEDURE — 96376 TX/PRO/DX INJ SAME DRUG ADON: CPT

## 2020-03-20 PROCEDURE — 25800030 ZZH RX IP 258 OP 636: Performed by: HOSPITALIST

## 2020-03-20 PROCEDURE — 99232 SBSQ HOSP IP/OBS MODERATE 35: CPT | Performed by: HOSPITALIST

## 2020-03-20 PROCEDURE — 80048 BASIC METABOLIC PNL TOTAL CA: CPT | Performed by: HOSPITALIST

## 2020-03-20 PROCEDURE — 25000128 H RX IP 250 OP 636: Performed by: HOSPITALIST

## 2020-03-20 PROCEDURE — 99207 ZZC APP CREDIT; MD BILLING SHARED VISIT: CPT | Performed by: PHYSICIAN ASSISTANT

## 2020-03-20 PROCEDURE — 25000132 ZZH RX MED GY IP 250 OP 250 PS 637: Performed by: HOSPITALIST

## 2020-03-20 PROCEDURE — 99207 ZZC MOONLIGHTING INDICATOR: CPT | Performed by: PHYSICIAN ASSISTANT

## 2020-03-20 PROCEDURE — 25800030 ZZH RX IP 258 OP 636: Performed by: PHYSICIAN ASSISTANT

## 2020-03-20 PROCEDURE — 85027 COMPLETE CBC AUTOMATED: CPT | Performed by: HOSPITALIST

## 2020-03-20 PROCEDURE — 74177 CT ABD & PELVIS W/CONTRAST: CPT

## 2020-03-20 PROCEDURE — G0378 HOSPITAL OBSERVATION PER HR: HCPCS

## 2020-03-20 PROCEDURE — 36415 COLL VENOUS BLD VENIPUNCTURE: CPT | Performed by: HOSPITALIST

## 2020-03-20 PROCEDURE — 83605 ASSAY OF LACTIC ACID: CPT | Performed by: HOSPITALIST

## 2020-03-20 PROCEDURE — 25000125 ZZHC RX 250: Performed by: HOSPITALIST

## 2020-03-20 RX ORDER — IOPAMIDOL 755 MG/ML
69 INJECTION, SOLUTION INTRAVASCULAR ONCE
Status: COMPLETED | OUTPATIENT
Start: 2020-03-20 | End: 2020-03-20

## 2020-03-20 RX ADMIN — METRONIDAZOLE 500 MG: 500 INJECTION, SOLUTION INTRAVENOUS at 11:59

## 2020-03-20 RX ADMIN — HYDROMORPHONE HYDROCHLORIDE 0.3 MG: 1 INJECTION, SOLUTION INTRAMUSCULAR; INTRAVENOUS; SUBCUTANEOUS at 19:39

## 2020-03-20 RX ADMIN — HYDROCODONE BITARTRATE AND ACETAMINOPHEN 1 TABLET: 5; 325 TABLET ORAL at 17:42

## 2020-03-20 RX ADMIN — HYDROCODONE BITARTRATE AND ACETAMINOPHEN 2 TABLET: 5; 325 TABLET ORAL at 04:58

## 2020-03-20 RX ADMIN — ACETAMINOPHEN 650 MG: 325 TABLET, FILM COATED ORAL at 15:30

## 2020-03-20 RX ADMIN — METRONIDAZOLE 500 MG: 500 INJECTION, SOLUTION INTRAVENOUS at 19:39

## 2020-03-20 RX ADMIN — SODIUM CHLORIDE, POTASSIUM CHLORIDE, SODIUM LACTATE AND CALCIUM CHLORIDE: 600; 310; 30; 20 INJECTION, SOLUTION INTRAVENOUS at 00:20

## 2020-03-20 RX ADMIN — HYDROMORPHONE HYDROCHLORIDE 0.3 MG: 1 INJECTION, SOLUTION INTRAMUSCULAR; INTRAVENOUS; SUBCUTANEOUS at 09:45

## 2020-03-20 RX ADMIN — SODIUM CHLORIDE, POTASSIUM CHLORIDE, SODIUM LACTATE AND CALCIUM CHLORIDE: 600; 310; 30; 20 INJECTION, SOLUTION INTRAVENOUS at 19:42

## 2020-03-20 RX ADMIN — HYDROMORPHONE HYDROCHLORIDE 0.3 MG: 1 INJECTION, SOLUTION INTRAMUSCULAR; INTRAVENOUS; SUBCUTANEOUS at 14:48

## 2020-03-20 RX ADMIN — HYDROCODONE BITARTRATE AND ACETAMINOPHEN 2 TABLET: 5; 325 TABLET ORAL at 22:31

## 2020-03-20 RX ADMIN — METRONIDAZOLE 500 MG: 500 INJECTION, SOLUTION INTRAVENOUS at 04:57

## 2020-03-20 RX ADMIN — HYDROCODONE BITARTRATE AND ACETAMINOPHEN 2 TABLET: 5; 325 TABLET ORAL at 00:17

## 2020-03-20 RX ADMIN — SODIUM CHLORIDE 60 ML: 9 INJECTION, SOLUTION INTRAVENOUS at 09:34

## 2020-03-20 RX ADMIN — IOPAMIDOL 69 ML: 755 INJECTION, SOLUTION INTRAVENOUS at 09:33

## 2020-03-20 RX ADMIN — CEFTRIAXONE SODIUM 1 G: 1 INJECTION, POWDER, FOR SOLUTION INTRAMUSCULAR; INTRAVENOUS at 04:20

## 2020-03-20 ASSESSMENT — ACTIVITIES OF DAILY LIVING (ADL)
RETIRED_EATING: 0-->INDEPENDENT
ADLS_ACUITY_SCORE: 12
FALL_HISTORY_WITHIN_LAST_SIX_MONTHS: NO
TOILETING: 0-->INDEPENDENT
BATHING: 0-->INDEPENDENT
AMBULATION: 0-->INDEPENDENT
SWALLOWING: 0-->SWALLOWS FOODS/LIQUIDS WITHOUT DIFFICULTY
TRANSFERRING: 0-->INDEPENDENT
DRESS: 0-->INDEPENDENT
ADLS_ACUITY_SCORE: 12
RETIRED_COMMUNICATION: 0-->UNDERSTANDS/COMMUNICATES WITHOUT DIFFICULTY
ADLS_ACUITY_SCORE: 10
COGNITION: 0 - NO COGNITION ISSUES REPORTED

## 2020-03-20 NOTE — PROGRESS NOTES
[facility]  Hospitalist Progress Note  Date of Service (when I saw the patient): 03/20/2020    Assessment & Plan   Elier Godinez is a 45 year old year old male who has a PMH significant for Hx appendectomy, chronic latent hep B. Pt was admitted to [facility] observation unit on 3/19/2020 for diverticulitis and continued abdominal pain. He was subsequently admitted to inpatient 3/20/2020 when repeat CT scan revealed Worsening small bowel obstruction secondary to inflammatory change in the distal ileum, no abscess demonstrated. The following problems were addressed during his hospitalization:    Summary:  Acute sigmoid diverticulitis with microperforation:   Small bowel obstruction secondary to inflammatory change in the distal ileum  Presented with abd pain x1 day PTA. No N/V/D. No fevers chills. Found to have diverticulitis on imaging. Denies prior hx of diverticulitis per pt. CT with acute sigmoid diverticulitis with microperforation. No abscess noted. Continued pain 3/19 with minimal PO, but abdomen soft. Pt reports abdominal pain is improved a little bit today, but abdominal exam is firmer and still not taking good PO. Developed leukocytosis 3/20 (10.2-->16.1). Repeat CT scan 3/20 showing worsening SBO  - Admit to inpatient from observation today (3/20)  - Continue with IV Ceftriaxone and flagyl  - Continue IV hydration  - Pain control  - Diet changed to NPO today (from full)  - CRS consulted and recommend continued medical management     Acute kidney injury  Presumed 2/2 dehydration and poor po intake. UA neg  - Cr improved 3/20 from 1.29 --> 1.24  - Continue IV fluids and avoid nephrotoxins  - Repeat BMP in AM     Chronic latent HBV  Presumed vertical transmission. Recently diagnosed.   - Follows with GI.   - Continue outpatient follow up    DVT Prophylaxis: Ambulate every shift  Code Status: Full Code  Disposition: Expected discharge in 2-3 days once SBO and abdominal pain improves and  patient is taking PO.    Elizabeth GRULLON-C    Interval History   Pt found resting in bed on his side. Pt alert and responds to voice. His pain seems a little better today and he is feeling a little better overall. No nausea or vomiting this morning. No bowel movements today, but had one yesterday evening. Increased abdominal discomfort when actively voiding and passing stool, but abdominal pain improves afterwards.     -Data reviewed today: I reviewed all new labs and imaging results over the last 24 hours. I personally reviewed no images or EKG's today.    Physical Exam   Temp: 97.6  F (36.4  C) Temp src: Oral BP: 91/57   Heart Rate: 101 Resp: 16 SpO2: 97 % O2 Device: None (Room air)    Vitals:    03/19/20 0057 03/19/20 0341   Weight: 63.5 kg (140 lb) 62.4 kg (137 lb 9.6 oz)     Vital Signs with Ranges  Temp:  [97.6  F (36.4  C)-99.7  F (37.6  C)] 97.6  F (36.4  C)  Heart Rate:  [] 101  Resp:  [16-24] 16  BP: ()/(57-66) 91/57  SpO2:  [93 %-97 %] 97 %  I/O last 3 completed shifts:  In: 780 [P.O.:780]  Out: -     Constitutional: alert, oriented, no acute distress  Eyes: No scleral icterus or injection  ENT: Normocephalic, atraumatic  Respiratory: No secondary muscle use or labored breathing. Lungs clear to auscultation bilaterally without wheezes or rhonchi   Cardiovascular: RRR without murmur  GI: Abdomen still soft soft but possibly firmer than yesterday, tender to palpation of bilateral lower quadrants, non-distended.  Bowel sounds active. No guarding, rigidity or rebound tenderness  MSK: able to move extremities on command without weakness, walks without weakness or ataxia  Skin/Integumen: warm, dry, in tact without rash or hives  Neuro:  responsive, alert, cooperative; oriented x3; appropriate mood and affect    Medications     lactated ringers 100 mL/hr at 03/20/20 0020       cefTRIAXone  1 g Intravenous Q24H     metroNIDAZOLE  500 mg Intravenous Q8H       Data   Recent Labs   Lab 03/20/20  0636  03/19/20  0105   WBC 16.1* 10.2   HGB 12.6* 15.7   MCV 93 89   * 199    140   POTASSIUM 3.4 3.5   CHLORIDE 112* 109   CO2 24 26   BUN 13 16   CR 1.24 1.29*   ANIONGAP 4 5   GALDINO 8.4* 9.7   * 137*   ALBUMIN  --  3.8   PROTTOTAL  --  7.7   BILITOTAL  --  0.8   ALKPHOS  --  85   ALT  --  43   AST  --  28   LIPASE  --  143       Recent Results (from the past 24 hour(s))   CT Abdomen Pelvis w Contrast    Narrative    CT ABDOMEN AND PELVIS WITH CONTRAST 3/20/2020 9:45 AM     HISTORY: Abdominal pain, fever, abscess suspected.    COMPARISON: March 19, 2020    TECHNIQUE: Volumetric helical acquisition of CT images from the lung  bases through the symphysis pubis after the administration of 69 mL  Isovue-370 intravenous contrast. Radiation dose for this scan was  reduced using automated exposure control, adjustment of the mA and/or  kV according to patient size, or iterative reconstruction technique.    FINDINGS: Mechanical small bowel obstruction secondary to inflamed  distal ileum. No abscess demonstrated. Stranding and minimal free  layering fluid present. Tiny amount of free intraperitoneal air  similar to previous study. The liver, spleen, adrenal glands, kidneys,  and pancreas demonstrate no worrisome focal lesion. Mild bibasilar  atelectasis and/or infiltrate. No definite effusions. No  hydronephrosis. Normal caliber aorta. Gallbladder unremarkable.      Impression    IMPRESSION: Worsening small bowel obstruction secondary to  inflammatory change in the distal ileum, no abscess demonstrated.  There is fluid in the pelvis and mesentery but this appears  unencapsulated without significant surrounding inflammatory change.

## 2020-03-20 NOTE — CONSULTS
[facility]  Colon and Rectal Surgery Consult Note  Name: Elier Godinez    MRN: [id number]  YOB: 1975    Age: 45 year old  Date of admission: 3/19/2020  Primary care provider: Pedro York     Requesting Physician:  Dr. Maria   Reason for consult:  diverticulitis           History of Present Illness:   Elier Godinez is a 45 year old male, seen at the request of Dr. Maria, who presents with abdominal pain that began at 7pm on 3/18/20. He initially rated the pain at 9/10 in intensity when it started, intermittent, but frequent. Currently 2/10 after pain medications in the ED. Last po intake was lunch - [employer]. He has had occasional pain of this type before, but not to this degree. Denies fevers or chills. No nausea, vomiting or diarrhea. He does not have any other significant medical history.     CRS was consulted as the patient's WBC increased from 10.2 to 16.1 after 24 hours of IV antibiotics and a full liquid diet. Currently, the patient is lying in bed. He states his pain has improved since being admitted, but he continues to have abdominal pain. His last bowel movement was yesterday and there was no blood in the stool. He denies a family history of colorectal cancer or IBD.       Colonoscopy History: He has never had a colonoscopy.     Past abdominal surgery: Appendectomy.            Past Medical History:   No past medical history on file.          Past Surgical History:   No past surgical history on file.            Social History:     Social History     Tobacco Use     Smoking status: Not on file   Substance Use Topics     Alcohol use: Not on file             Family History:   No family history on file.          Allergies:   No Known Allergies          Medications:       cefTRIAXone  1 g Intravenous Q24H     metroNIDAZOLE  500 mg Intravenous Q8H             Review of Systems:   A comprehensive greater than 10 system review of systems was carried out.  Pertinent  "positives and negatives are noted above.  Otherwise negative for contributory info.            Physical Exam:     Blood pressure 91/57, pulse 96, temperature 97.6  F (36.4  C), temperature source Oral, resp. rate 16, height 1.676 m (5' 6\"), weight 62.4 kg (137 lb 9.6 oz), SpO2 97 %.    Intake/Output Summary (Last 24 hours) at 3/20/2020 1023  Last data filed at 3/19/2020 2300  Gross per 24 hour   Intake 780 ml   Output --   Net 780 ml     Exam:  General - Awake alert and oriented, appears stated age  Pulm - Non-labored breathing with normal respiratory effort  CVS - reg rate and rhythm, no peripheral edema  Abd - Soft, tender in RLQ, non distended.  No guarding, rigidity or peritoneal signs.   Rectal exam was not performed.  Neuro - CN II-XII grossly intact  Musculoskeletal - extremities with no clubbing, cyanosis or edema; able to ambulate  Psych - responsive, alert, cooperative; oriented x3; appropriate mood and affect  External/skin - inspection reveals no rashes, lesions or ulcers, normal coloring         Data Reviewed:     Results for orders placed or performed during the hospital encounter of 03/19/20   CT Abdomen Pelvis w Contrast    Narrative    EXAM: CT ABDOMEN PELVIS W CONTRAST  LOCATION: [facility]  DATE/TIME: 3/19/2020 1:49 AM    INDICATION: Abdominal pain with rebound tenderness.  COMPARISON: None.  TECHNIQUE: CT scan of the abdomen and pelvis was performed following injection of IV contrast. Multiplanar reformats were obtained. Dose reduction techniques were used.  CONTRAST: 71 mL Isovue-370.    FINDINGS:   LOWER CHEST: Normal.    HEPATOBILIARY: Normal.    PANCREAS: Normal.    SPLEEN: Normal.    ADRENAL GLANDS: Normal.    KIDNEYS/BLADDER: Tiny renal lesions too small to characterize definitively though likely benign.    BOWEL: Diverticulosis with sigmoid colon wall thickening, adjacent inflammatory change and small bubbles of extraluminal gas consistent with perforated diverticulitis. " Inflammatory changes involve adjacent loops of small bowel. No significant abscess at   this time. No bowel obstruction.    LYMPH NODES: Normal.    VASCULATURE: Unremarkable.    PELVIC ORGANS: Normal.    MUSCULOSKELETAL: Tiny fat-containing paraumbilical hernia. Degenerative disease. Presumed bone islands.      Impression    IMPRESSION:   1.  Evidence for acute sigmoid diverticulitis with microperforation. No abscess at this time. Adjacent loops of small bowel likely secondarily inflamed. No obstruction. If not performed per routine screening guidelines, GI or surgical consult recommended   to consider colonoscopy and exclude other underlying pathology.    Results called to Amie De Jesus at 2:04 AM.     CT Abdomen Pelvis w Contrast    Narrative    CT ABDOMEN AND PELVIS WITH CONTRAST 3/20/2020 9:45 AM     HISTORY: Abdominal pain, fever, abscess suspected.    COMPARISON: March 19, 2020    TECHNIQUE: Volumetric helical acquisition of CT images from the lung  bases through the symphysis pubis after the administration of 69 mL  Isovue-370 intravenous contrast. Radiation dose for this scan was  reduced using automated exposure control, adjustment of the mA and/or  kV according to patient size, or iterative reconstruction technique.    FINDINGS: Mechanical small bowel obstruction secondary to inflamed  distal ileum. No abscess demonstrated. Stranding and minimal free  layering fluid present. Tiny amount of free intraperitoneal air  similar to previous study. The liver, spleen, adrenal glands, kidneys,  and pancreas demonstrate no worrisome focal lesion. Mild bibasilar  atelectasis and/or infiltrate. No definite effusions. No  hydronephrosis. Normal caliber aorta. Gallbladder unremarkable.      Impression    IMPRESSION: Worsening small bowel obstruction secondary to  inflammatory change in the distal ileum, no abscess demonstrated.  There is fluid in the pelvis and mesentery but this appears  unencapsulated without  significant surrounding inflammatory change.       Recent Labs   Lab 03/20/20  0636 03/19/20  0105   WBC 16.1* 10.2   HGB 12.6* 15.7   HCT 38.1* 46.2   MCV 93 89   * 199     Recent Labs   Lab 03/20/20  0636 03/19/20  0105    140   POTASSIUM 3.4 3.5   CHLORIDE 112* 109   CO2 24 26   ANIONGAP 4 5   * 137*   BUN 13 16   CR 1.24 1.29*   GFRESTIMATED 70 67   GFRESTBLACK 81 78   GALDINO 8.4* 9.7   PROTTOTAL  --  7.7   ALBUMIN  --  3.8   BILITOTAL  --  0.8   ALKPHOS  --  85   AST  --  28   ALT  --  43         Assessment and Plan:   Elier Godinez is a 45 year old male who presented with abdominal pain and found to have acute diverticulitis with microperforation on CT. Currently, AVSS. Now with leukocytosis. Recommend strict NPO and bowel rest, continue antibiotics. If no improvement in next 24 hours will likely need to repeat CT scan. Discussed if improvement with conservative management recommend colonoscopy in 6-8 weeks. If no improvement with conservative management would need surgery this admission and likely require a stoma.     Plan:  1. Admit to hospitalist.  2. Surgery: No indication for urgent surgery at this time.   3. Diet: NPO  4. IV Fluids: Continue  5. Antibiotics:  Continue IV Zosyn  6. Medications:  (ie. Home meds to give/hold, blood thinners) per primary  7. I&O s:  strict I&O s   8. Labs:   - Reviewed: Yes  - Ordered: none   9. Imaging:   - Dr. Gayle and myself have personally viewed: CT abd/pelvis  - Ordered:  None, may need to repeat CT tomorrow if no improvement or worsening  10. Activity:  OOB, ambulate as able  11. DVT prophylaxis: SCD s  12. This plan has been discussed with Dr. Gayle    Patient specific identified risk factors considered as part of today s evaluation include:  none    Additional history obtained from chart review.  Time spent on consultation: 25 minutes, greater than 50% spent on counseling and/or coordination of care.    Chelsea Mittal PA-C  Colon & Rectal Surgery  [facility]  [phone]

## 2020-03-20 NOTE — PLAN OF CARE
A&Ox4. VSS on RA, T-max 99.1. Abdominal pain controlled w/ norco. IV antibiotic administered. Matthew fulls, denies nausea. Up to BR SBA. Voiding adequately w/o difficulty. Soft BM evening shift. AM labs pending. Potential GI consult. Continue to monitor.

## 2020-03-20 NOTE — PROGRESS NOTES
Observation goals  PRIOR TO DISCHARGE     Comments: -diagnostic tests and consults completed and resulted -NOT MET; TO BE ADMITTED  -vital signs normal or at patient baseline - MET  -tolerating oral intake to maintain hydration - MET  -adequate pain control on oral analgesics - MET  Nurse to notify provider when observation goals have been met and patient is ready for discharge

## 2020-03-20 NOTE — PROGRESS NOTES
Observation Goals      -diagnostic tests and consults completed and resulted - not met, possible GI consult if not tolerating food   -vital signs normal or at patient baseline - met   -tolerating oral intake to maintain hydration - partially met - Full liquid diet   -adequate pain control on oral analgesics - met with PRNs   Nurse to notify provider when observation goals have been met and patient is ready for discharge.      Pt is A&Ox4, VSS on RA excpt for hypotension of (93/57) and temp of (99), Full liquid diet,  Provider notified continue to monitor, Lab drawn with normal Lactate acid level, Pt reports 2 and 7 of intermittent pain in Abd, managed with prn meds. IV running, denies nausea and vomiting. GI consult if not tolerating food appropriately.

## 2020-03-20 NOTE — PROGRESS NOTES
Observation goals  PRIOR TO DISCHARGE     Comments: -diagnostic tests and consults completed and resulted -NOT MET- TO BE ADMITTED  -vital signs normal or at patient baseline - MET  -tolerating oral intake to maintain hydration - MET  -adequate pain control on oral analgesics - MET  Nurse to notify provider when observation goals have been met and patient is ready for discharge.

## 2020-03-20 NOTE — PROGRESS NOTES
Telephone report was given to Station [address] RN.  Patient will be transported to Room. 15-2 via  to be accompanied by NA.  Belongings: ( clothing) returned to patient

## 2020-03-20 NOTE — PROGRESS NOTES
END OF SHIFT REPORT :          DATE & TIME: March 20, 2020        SHIFT: 7 AM- 3 PM    Elier Godinez 45 year old male  was admitted on 3/19/2020 due to abdominal pain. Patient is:Full Code. ISOLATION:No. : No. Pt. is not on telemetry. Patient is A, O x 4 and is up  Independently.     VS are : Temp: 97.6  F (36.4  C) BP: 91/57   Heart Rate: 101 Resp: 16  SpO2: 97 % O2 Device: None (Room air)    Abnormal Lab/Test/ Procedures:  Abdominal CT (3/20): worsening SBO secondary to inflamatory changes in the distal ileum. Diet: NPO. IVF: LR @ 125 cc/hr. Skin intact.   Bowel/Bladder:  continent. Pain: abdominal pain. Getting Dilaudid 0.3 mg IV prn. Drains/Devices: none. Consult: none. Tests/Procedures for next shift: none . Other Important Info: WBC elevated today. Pt has Chronic Hep B. Anticipated DC date: being admitted to .

## 2020-03-20 NOTE — PROGRESS NOTES
MD Notification    Notified Person: MD    Notified Person Name: Elizabeth PA    Notification Date/Time: 3/20 3:25    Notification Interaction: web paged    Purpose of Notification: Temp of 101.6     Orders Received: Give Tylenol and continue to monitor    Comments:

## 2020-03-20 NOTE — PLAN OF CARE
OBS GOALS    -diagnostic tests and consults completed and resulted: NOT MET; AM labs, potential GI consult if pt not improving    -vital signs normal or at patient baseline: MET; although BPs soft    -tolerating oral intake to maintain hydration: NOT MET; IVF infusing    -adequate pain control on oral analgesics: MET

## 2020-03-20 NOTE — PLAN OF CARE
DATE & TIME: 3/20/2020    Cognitive Concerns/ Orientation : A&Ox4  BEHAVIOR & AGGRESSION TOOL COLOR: Green  CIWA SCORE: NA   ABNL VS/O2: VSS on RA  MOBILITY: Ind, enc OOB activity, amb in jin x1, to BR x1.  PAIN MANAGMENT: PRN norco x1 tab for h/a and abd discomfort  DIET: NPO x ice  BOWEL/BLADDER: cont, no BM, no flatus  ABNL LAB/BG: WBC 16  DRAIN/DEVICES: PIV LR at 125cc/hr  TELEMETRY RHYTHM: NA  SKIN: Intact  TESTS/PROCEDURES: NA  D/C DAY/GOALS/PLACE: Pending progress  OTHER IMPORTANT INFO: Transfer from Obs. All belongings were with pt and remain at bedside. SBO written education given to pt.

## 2020-03-20 NOTE — PLAN OF CARE
OBS GOALS    -diagnostic tests and consults completed and resulted: NOT MET; AM labs, potential GI consult if pt not improving    -vital signs normal or at patient baseline: MET; BPs soft    -tolerating oral intake to maintain hydration: NOT MET; IVF infusing    -adequate pain control on oral analgesics: MET

## 2020-03-20 NOTE — PROGRESS NOTES
Pt denies pain, vs  with low blood pressure. Denies lightheadedness or dizziness.  Sepsis protocol fired lactic acid drawn and resulted.

## 2020-03-20 NOTE — PROGRESS NOTES
Observation goals  PRIOR TO DISCHARGE     Comments: -diagnostic tests and consults completed and resulted   -vital signs normal or at patient baseline - MET  -tolerating oral intake to maintain hydration - NOT MET- NPO   -adequate pain control on oral analgesics   Nurse to notify provider when observation g- METoals have been met and patient is ready for discharge.

## 2020-03-20 NOTE — PROVIDER NOTIFICATION
MD Notification    Notified Person: MD YADI Fuchs     Notified Person Name:    Notification Date/Time:3/19/2020 2100    Notification Interaction:telephone    Purpose of Notification:low blood pressure and lactic acid results.     Orders Received: cont. To monitor.    Comments:

## 2020-03-21 LAB
ANION GAP SERPL CALCULATED.3IONS-SCNC: 7 MMOL/L (ref 3–14)
BUN SERPL-MCNC: 13 MG/DL (ref 7–30)
CALCIUM SERPL-MCNC: 8.8 MG/DL (ref 8.5–10.1)
CHLORIDE SERPL-SCNC: 109 MMOL/L (ref 94–109)
CO2 SERPL-SCNC: 22 MMOL/L (ref 20–32)
CREAT SERPL-MCNC: 1.1 MG/DL (ref 0.66–1.25)
ERYTHROCYTE [DISTWIDTH] IN BLOOD BY AUTOMATED COUNT: 13.7 % (ref 10–15)
GFR SERPL CREATININE-BSD FRML MDRD: 81 ML/MIN/{1.73_M2}
GLUCOSE SERPL-MCNC: 94 MG/DL (ref 70–99)
HCT VFR BLD AUTO: 38.9 % (ref 40–53)
HGB BLD-MCNC: 13 G/DL (ref 13.3–17.7)
MAGNESIUM SERPL-MCNC: 1.9 MG/DL (ref 1.6–2.3)
MCH RBC QN AUTO: 30.7 PG (ref 26.5–33)
MCHC RBC AUTO-ENTMCNC: 33.4 G/DL (ref 31.5–36.5)
MCV RBC AUTO: 92 FL (ref 78–100)
PLATELET # BLD AUTO: 153 10E9/L (ref 150–450)
POTASSIUM SERPL-SCNC: 3.6 MMOL/L (ref 3.4–5.3)
RBC # BLD AUTO: 4.23 10E12/L (ref 4.4–5.9)
SODIUM SERPL-SCNC: 138 MMOL/L (ref 133–144)
WBC # BLD AUTO: 16.1 10E9/L (ref 4–11)

## 2020-03-21 PROCEDURE — 85027 COMPLETE CBC AUTOMATED: CPT | Performed by: HOSPITALIST

## 2020-03-21 PROCEDURE — 83735 ASSAY OF MAGNESIUM: CPT | Performed by: HOSPITALIST

## 2020-03-21 PROCEDURE — 25800030 ZZH RX IP 258 OP 636: Performed by: COLON & RECTAL SURGERY

## 2020-03-21 PROCEDURE — 25000128 H RX IP 250 OP 636: Performed by: HOSPITALIST

## 2020-03-21 PROCEDURE — 99232 SBSQ HOSP IP/OBS MODERATE 35: CPT | Performed by: HOSPITALIST

## 2020-03-21 PROCEDURE — 12000000 ZZH R&B MED SURG/OB

## 2020-03-21 PROCEDURE — 36415 COLL VENOUS BLD VENIPUNCTURE: CPT | Performed by: HOSPITALIST

## 2020-03-21 PROCEDURE — 25000132 ZZH RX MED GY IP 250 OP 250 PS 637: Performed by: HOSPITALIST

## 2020-03-21 PROCEDURE — 80048 BASIC METABOLIC PNL TOTAL CA: CPT | Performed by: HOSPITALIST

## 2020-03-21 PROCEDURE — 25800030 ZZH RX IP 258 OP 636: Performed by: HOSPITALIST

## 2020-03-21 RX ADMIN — CEFTRIAXONE SODIUM 1 G: 1 INJECTION, POWDER, FOR SOLUTION INTRAMUSCULAR; INTRAVENOUS at 03:58

## 2020-03-21 RX ADMIN — SODIUM CHLORIDE, POTASSIUM CHLORIDE, SODIUM LACTATE AND CALCIUM CHLORIDE: 600; 310; 30; 20 INJECTION, SOLUTION INTRAVENOUS at 14:56

## 2020-03-21 RX ADMIN — SODIUM CHLORIDE, POTASSIUM CHLORIDE, SODIUM LACTATE AND CALCIUM CHLORIDE: 600; 310; 30; 20 INJECTION, SOLUTION INTRAVENOUS at 06:45

## 2020-03-21 RX ADMIN — HYDROMORPHONE HYDROCHLORIDE 0.3 MG: 1 INJECTION, SOLUTION INTRAMUSCULAR; INTRAVENOUS; SUBCUTANEOUS at 00:11

## 2020-03-21 RX ADMIN — METRONIDAZOLE 500 MG: 500 INJECTION, SOLUTION INTRAVENOUS at 15:00

## 2020-03-21 RX ADMIN — HYDROCODONE BITARTRATE AND ACETAMINOPHEN 1 TABLET: 5; 325 TABLET ORAL at 15:01

## 2020-03-21 RX ADMIN — HYDROCODONE BITARTRATE AND ACETAMINOPHEN 2 TABLET: 5; 325 TABLET ORAL at 06:43

## 2020-03-21 RX ADMIN — METRONIDAZOLE 500 MG: 500 INJECTION, SOLUTION INTRAVENOUS at 04:44

## 2020-03-21 RX ADMIN — METRONIDAZOLE 500 MG: 500 INJECTION, SOLUTION INTRAVENOUS at 22:17

## 2020-03-21 RX ADMIN — HYDROCODONE BITARTRATE AND ACETAMINOPHEN 2 TABLET: 5; 325 TABLET ORAL at 19:40

## 2020-03-21 RX ADMIN — HYDROMORPHONE HYDROCHLORIDE 0.3 MG: 1 INJECTION, SOLUTION INTRAMUSCULAR; INTRAVENOUS; SUBCUTANEOUS at 16:11

## 2020-03-21 ASSESSMENT — ACTIVITIES OF DAILY LIVING (ADL)
ADLS_ACUITY_SCORE: 10

## 2020-03-21 NOTE — PLAN OF CARE
DATE & TIME: 3/20/2020 5607-2177   Cognitive Concerns/ Orientation : A&Ox4  BEHAVIOR & AGGRESSION TOOL COLOR: Green  CIWA SCORE: NA   ABNL VS/O2: VSS on RA  MOBILITY: Ind, enc OOB activity, amb to BR x1.  PAIN MANAGMENT: PRN dilaudid x1 and norco x1 for h/a and abd discomfort  DIET: NPO x ice  BOWEL/BLADDER: cont, no flatus. Loose stool x2 this evening.   ABNL LAB/BG: WBC 16. Lactic fired 1.1  DRAIN/DEVICES: PIV LR at 125cc/hr  TELEMETRY RHYTHM: NA  SKIN: Intact  TESTS/PROCEDURES: NA  D/C DAY/GOALS/PLACE: Pending progress  OTHER IMPORTANT INFO: Transfer from Obs. All belongings were with pt and remain at bedside.

## 2020-03-21 NOTE — PROGRESS NOTES
COLON & RECTAL SURGERY  PROGRESS NOTE    March 21, 2020    SUBJECTIVE:  Feeling much less distended. Denies nausea. +Flatus and loose BM.     OBJECTIVE:  Temp:  [97.9  F (36.6  C)-101.6  F (38.7  C)] 98.2  F (36.8  C)  Heart Rate:  [] 93  Resp:  [16-20] 16  BP: (106-123)/(69-88) 123/88  SpO2:  [93 %-95 %] 94 %    Intake/Output Summary (Last 24 hours) at 3/21/2020 1032  Last data filed at 3/21/2020 1005  Gross per 24 hour   Intake 1554 ml   Output 100 ml   Net 1454 ml       GENERAL:  Awake, alert, no acute distress  EXTREMITIES: Warm and well perfused, no edema   ABDOMEN:  Soft, moderate distension, tender to deep palpation in right mid abdomen, supra-pubic area    LABS:  Lab Results   Component Value Date    WBC 16.1 03/21/2020     Lab Results   Component Value Date    HGB 13.0 03/21/2020     Lab Results   Component Value Date    HCT 38.9 03/21/2020     Lab Results   Component Value Date     03/21/2020     Last Basic Metabolic Panel:  Lab Results   Component Value Date     03/21/2020      Lab Results   Component Value Date    POTASSIUM 3.6 03/21/2020     Lab Results   Component Value Date    CHLORIDE 109 03/21/2020     Lab Results   Component Value Date    GALDINO 8.8 03/21/2020     Lab Results   Component Value Date    CO2 22 03/21/2020     Lab Results   Component Value Date    BUN 13 03/21/2020     Lab Results   Component Value Date    CR 1.10 03/21/2020     Lab Results   Component Value Date    GLC 94 03/21/2020       ASSESSMENT/PLAN: Elier Godinez is a 45 year old male admitted with acute,perforated diverticulitis. Abdominal exam improving. Continues to have leukocytosis to 16.    Clear liquid diet today. Encouraged to take slow and stop if increased abdominal symptoms.  Continue IV abx. Repeat WBC in AM.   PRN pain medication.  Decrease IVF  OOB, ambulate    For questions/paging, please contact the CRS office at [phone].    Nancy Cueva MD  [facility]    Office: [phone]  3/21/2020   10:32 AM

## 2020-03-21 NOTE — PLAN OF CARE
DATE & TIME: 3/21/2020 0075-4688  Cognitive Concerns/ Orientation : A&Ox4  BEHAVIOR & AGGRESSION TOOL COLOR: Green  CIWA SCORE: NA   ABNL VS/O2: VSS on RA  MOBILITY: Ind, amb to BR  PAIN MANAGMENT: PRN dilaudid x 1 and Norco x 1 for abd discomfort,  DIET: NPO x ice  BOWEL/BLADDER: cont, no flatus. Loose stool x 2 (1 unmeasured)   ABNL LAB/BG: WBC 16.1  DRAIN/DEVICES: PIV LR at 125cc/hr  TELEMETRY RHYTHM: NA  SKIN: Intact  TESTS/PROCEDURES: NA  D/C DAY/GOALS/PLACE: Pending progress  OTHER IMPORTANT INFO: Bowel sounds present, distended abdomen and tender to touch

## 2020-03-21 NOTE — PLAN OF CARE
DATE & TIME: 3/21/2020 1500  Cognitive Concerns/ Orientation : A&Ox4  BEHAVIOR & AGGRESSION TOOL COLOR: Green  CIWA SCORE: NA        ABNL VS/O2: VSS on RA  MOBILITY: Ind, amb to BR  PAIN MANAGMENT: Norco x 1 for abd discomfort,  DIET: Tolerating a clear liquid diet  BOWEL/BLADDER: Loose stool x 1. Denies nausea.  ABNL LAB/BG: WBC 16.1  DRAIN/DEVICES: PIV LR at 100cc/hr  TELEMETRY RHYTHM: NA  SKIN: Intact  TESTS/PROCEDURES: NA  D/C DAY/GOALS/PLACE: Pending progress  OTHER IMPORTANT INFO: Bowel sounds present, distended abdomen and tender to touch RLQ. Continues IV Rocephin and Flagyl.

## 2020-03-21 NOTE — PROGRESS NOTES
[facility]    Hospitalist Progress Note      Assessment & Plan   Elier Godinez is a 45 year old male who was admitted on 3/19/2020 for abdominal pain, found to have diverticulitis with microperforation, then due to worsening abdominal exam, repeat CT was performed and found worsening small bowel obstruction.    Acute sigmoid diverticulitis with microperforation:   Small bowel obstruction secondary to inflammatory change in the distal ileum  Presented with abd pain x1 day PTA. No N/V/D. No fevers chills. Found to have diverticulitis on imaging. Denies prior hx of diverticulitis per pt. CT with acute sigmoid diverticulitis with microperforation. No abscess noted. Developed leukocytosis 3/20 (10.2-->16.1), stable at 16.1 on 3/21. Repeat CT scan 3/20 showing worsening SBO  - Changed to inpatient status 3/20  - Continue with IV Ceftriaxone and flagyl  - Continue IV hydration  - Pain control  - resume clears tonight--did have loose BMs overnight, abdomen seems softer  - CRS consulted and recommend continued medical management     Acute kidney injury  Presumed 2/2 dehydration and poor po intake. UA neg  - Cr improved 3/20 from 1.29 --> 1.1  - Continue IV fluids  - Repeat BMP in AM     Chronic latent HBV  Presumed vertical transmission. Recently diagnosed.   - Follows with GI.   - Continue outpatient follow up    DVT Prophylaxis: Low Risk/Ambulatory with no VTE prophylaxis indicated  Code Status: Full Code  Expected discharge: 24-48 hours, recommended to prior living arrangement once tolerating diet, pain well controlled    Lacie Maria, DO  Text Page (7am - 6pm)    Interval History   Patient seen and examined. Lying in bed, resting. Reports pain is better, but relying on norco 2 tablets. Had 2 loose stools overnight, continues to pass gas. Is a little hungry, but reports more dry mouth. No nausea/vomiting.    -Data reviewed today: I reviewed all new labs and imaging results over the last 24 hours. I  personally reviewed no new imaging or EKGs    Physical Exam   Temp: 98.2  F (36.8  C) Temp src: Oral BP: 123/88   Heart Rate: 93 Resp: 16 SpO2: 94 % O2 Device: None (Room air)    Vitals:    03/19/20 0057 03/19/20 0341   Weight: 63.5 kg (140 lb) 62.4 kg (137 lb 9.6 oz)     Vital Signs with Ranges  Temp:  [97.9  F (36.6  C)-101.6  F (38.7  C)] 98.2  F (36.8  C)  Heart Rate:  [] 93  Resp:  [16-20] 16  BP: (106-123)/(69-88) 123/88  SpO2:  [93 %-95 %] 94 %  I/O last 3 completed shifts:  In: 1554 [I.V.:1554]  Out: -     Constitutional: Alert, cooperative, looks more comfortable than 3/20  Respiratory: Clear to auscultation bilaterally, no crackles or wheezing  Cardiovascular: Regular rate and rhythm, normal S1 and S2, and no murmur noted  GI: Bowel sounds present, mild distention, more soft than 3/20,  to palpation  Skin/Integumen: No rashes, no cyanosis, no edema  Other:     Medications     lactated ringers 125 mL/hr at 03/21/20 0647       cefTRIAXone  1 g Intravenous Q24H     metroNIDAZOLE  500 mg Intravenous Q8H       Data   Recent Labs   Lab 03/21/20  0908 03/20/20  0636 03/19/20  0105   WBC 16.1* 16.1* 10.2   HGB 13.0* 12.6* 15.7   MCV 92 93 89    148* 199    140 140   POTASSIUM 3.6 3.4 3.5   CHLORIDE 109 112* 109   CO2 22 24 26   BUN 13 13 16   CR 1.10 1.24 1.29*   ANIONGAP 7 4 5   GALDINO 8.8 8.4* 9.7   GLC 94 115* 137*   ALBUMIN  --   --  3.8   PROTTOTAL  --   --  7.7   BILITOTAL  --   --  0.8   ALKPHOS  --   --  85   ALT  --   --  43   AST  --   --  28   LIPASE  --   --  143       No results found for this or any previous visit (from the past 24 hour(s)).

## 2020-03-22 LAB
ANION GAP SERPL CALCULATED.3IONS-SCNC: 9 MMOL/L (ref 3–14)
BUN SERPL-MCNC: 10 MG/DL (ref 7–30)
CALCIUM SERPL-MCNC: 8.5 MG/DL (ref 8.5–10.1)
CHLORIDE SERPL-SCNC: 107 MMOL/L (ref 94–109)
CO2 SERPL-SCNC: 24 MMOL/L (ref 20–32)
CREAT SERPL-MCNC: 1.06 MG/DL (ref 0.66–1.25)
ERYTHROCYTE [DISTWIDTH] IN BLOOD BY AUTOMATED COUNT: 13.4 % (ref 10–15)
GFR SERPL CREATININE-BSD FRML MDRD: 85 ML/MIN/{1.73_M2}
GLUCOSE SERPL-MCNC: 85 MG/DL (ref 70–99)
HCT VFR BLD AUTO: 38.8 % (ref 40–53)
HGB BLD-MCNC: 12.9 G/DL (ref 13.3–17.7)
MCH RBC QN AUTO: 30.4 PG (ref 26.5–33)
MCHC RBC AUTO-ENTMCNC: 33.2 G/DL (ref 31.5–36.5)
MCV RBC AUTO: 91 FL (ref 78–100)
PLATELET # BLD AUTO: 181 10E9/L (ref 150–450)
POTASSIUM SERPL-SCNC: 3.3 MMOL/L (ref 3.4–5.3)
RBC # BLD AUTO: 4.25 10E12/L (ref 4.4–5.9)
SODIUM SERPL-SCNC: 140 MMOL/L (ref 133–144)
WBC # BLD AUTO: 9.9 10E9/L (ref 4–11)

## 2020-03-22 PROCEDURE — 25000132 ZZH RX MED GY IP 250 OP 250 PS 637: Performed by: HOSPITALIST

## 2020-03-22 PROCEDURE — 36415 COLL VENOUS BLD VENIPUNCTURE: CPT | Performed by: HOSPITALIST

## 2020-03-22 PROCEDURE — 99232 SBSQ HOSP IP/OBS MODERATE 35: CPT | Performed by: HOSPITALIST

## 2020-03-22 PROCEDURE — 25800030 ZZH RX IP 258 OP 636: Performed by: COLON & RECTAL SURGERY

## 2020-03-22 PROCEDURE — 12000000 ZZH R&B MED SURG/OB

## 2020-03-22 PROCEDURE — 85027 COMPLETE CBC AUTOMATED: CPT | Performed by: HOSPITALIST

## 2020-03-22 PROCEDURE — 25000128 H RX IP 250 OP 636: Performed by: HOSPITALIST

## 2020-03-22 PROCEDURE — 80048 BASIC METABOLIC PNL TOTAL CA: CPT | Performed by: HOSPITALIST

## 2020-03-22 PROCEDURE — 25800030 ZZH RX IP 258 OP 636: Performed by: HOSPITALIST

## 2020-03-22 RX ORDER — POTASSIUM CHLORIDE 1500 MG/1
40 TABLET, EXTENDED RELEASE ORAL ONCE
Status: COMPLETED | OUTPATIENT
Start: 2020-03-22 | End: 2020-03-22

## 2020-03-22 RX ADMIN — POTASSIUM CHLORIDE 40 MEQ: 1500 TABLET, EXTENDED RELEASE ORAL at 10:36

## 2020-03-22 RX ADMIN — HYDROCODONE BITARTRATE AND ACETAMINOPHEN 2 TABLET: 5; 325 TABLET ORAL at 17:33

## 2020-03-22 RX ADMIN — METRONIDAZOLE 500 MG: 500 INJECTION, SOLUTION INTRAVENOUS at 15:00

## 2020-03-22 RX ADMIN — METRONIDAZOLE 500 MG: 500 INJECTION, SOLUTION INTRAVENOUS at 05:47

## 2020-03-22 RX ADMIN — SODIUM CHLORIDE, POTASSIUM CHLORIDE, SODIUM LACTATE AND CALCIUM CHLORIDE: 600; 310; 30; 20 INJECTION, SOLUTION INTRAVENOUS at 06:57

## 2020-03-22 RX ADMIN — CEFTRIAXONE SODIUM 1 G: 1 INJECTION, POWDER, FOR SOLUTION INTRAMUSCULAR; INTRAVENOUS at 04:42

## 2020-03-22 RX ADMIN — METRONIDAZOLE 500 MG: 500 INJECTION, SOLUTION INTRAVENOUS at 22:07

## 2020-03-22 RX ADMIN — SODIUM CHLORIDE, POTASSIUM CHLORIDE, SODIUM LACTATE AND CALCIUM CHLORIDE: 600; 310; 30; 20 INJECTION, SOLUTION INTRAVENOUS at 17:34

## 2020-03-22 RX ADMIN — HYDROCODONE BITARTRATE AND ACETAMINOPHEN 2 TABLET: 5; 325 TABLET ORAL at 22:07

## 2020-03-22 RX ADMIN — HYDROCODONE BITARTRATE AND ACETAMINOPHEN 2 TABLET: 5; 325 TABLET ORAL at 02:32

## 2020-03-22 RX ADMIN — HYDROCODONE BITARTRATE AND ACETAMINOPHEN 2 TABLET: 5; 325 TABLET ORAL at 11:49

## 2020-03-22 ASSESSMENT — ACTIVITIES OF DAILY LIVING (ADL)
ADLS_ACUITY_SCORE: 10

## 2020-03-22 NOTE — PROGRESS NOTES
[facility]    Hospitalist Progress Note      Assessment & Plan   Elier Godinez is a 45 year old male who was admitted on 3/19/2020 for abdominal pain, found to have diverticulitis with microperforation, then due to worsening abdominal exam, repeat CT was performed and found worsening small bowel obstruction.    Acute sigmoid diverticulitis with microperforation:   Small bowel obstruction secondary to inflammatory change in the distal ileum  Presented with abd pain x1 day PTA. No N/V/D. No fevers chills. Found to have diverticulitis on imaging. Denies prior hx of diverticulitis per pt. CT with acute sigmoid diverticulitis with microperforation. No abscess noted. Developed leukocytosis 3/20 (10.2-->16.1), returned to normal on 3/22 (9.9). Repeat CT scan 3/20 showed worsening SBO. Changed to inpatient status 3/20  - Continue with IV Ceftriaxone and flagyl  - Continue IV hydration, reduce rate to 75 ml/hr  - Pain control  - No BM overnight, but abdominal pain improving  - Full liquid diet trial today, if worsening abdominal pain, decrease back to clears or even NPO with consideration for repeat CT in upcoming days  - CRS consulted and recommend continued medical management     Acute kidney injury, improved  Presumed 2/2 dehydration and poor po intake. UA neg. Cr was 1.29 on admit  - Cr improved 3/21, now 1.06  - Continue IV fluids  - Repeat BMP in AM     Hypokalemia  Secondary to poor PO intake.  - Replete 40meq KCl  - BMP in AM    Chronic latent HBV  Presumed vertical transmission. Recently diagnosed.   - Follows with GI.   - Continue outpatient follow up    DVT Prophylaxis: Low Risk/Ambulatory with no VTE prophylaxis indicated  Code Status: Full Code  Expected discharge: 24-48 hours, recommended to prior living arrangement once tolerating diet, pain well controlled    Lacie Maria, DO  Text Page (7am - 6pm)    Interval History   Patient seen and examined. Lying in bed, resting. Reports pain is  better, stretching out his pain medications throughout the day. No BM overnight, but still has some flatus. Tolerated some clears, discussed potential for full liquid diet today if he tolerates well. Leukocytosis resolved.    -Data reviewed today: I reviewed all new labs and imaging results over the last 24 hours. I personally reviewed no new imaging or EKGs    Physical Exam   Temp: 98.5  F (36.9  C) Temp src: Oral BP: (!) 134/95 Pulse: 81 Heart Rate: 83 Resp: 16 SpO2: 95 % O2 Device: None (Room air)    Vitals:    03/19/20 0057 03/19/20 0341   Weight: 63.5 kg (140 lb) 62.4 kg (137 lb 9.6 oz)     Vital Signs with Ranges  Temp:  [97.8  F (36.6  C)-98.6  F (37  C)] 98.5  F (36.9  C)  Pulse:  [81-92] 81  Heart Rate:  [83] 83  Resp:  [16-18] 16  BP: (125-136)/(63-97) 134/95  SpO2:  [92 %-96 %] 95 %  I/O last 3 completed shifts:  In: 1966 [P.O.:610; I.V.:1356]  Out: 100 [Stool:100]    Constitutional: Alert, cooperative, no acute distress, cooperative  Respiratory: Clear to auscultation bilaterally, no crackles or wheezing  Cardiovascular: Regular rate and rhythm, normal S1 and S2, and no murmur noted  GI: Bowel sounds present, less distention, softer,  to palpation in lower quadrants  Skin/Integumen: No rashes, no cyanosis, no edema  Other:     Medications     lactated ringers 100 mL/hr at 03/22/20 0657       cefTRIAXone  1 g Intravenous Q24H     metroNIDAZOLE  500 mg Intravenous Q8H       Data   Recent Labs   Lab 03/22/20  0837 03/21/20  0908 03/20/20  0636 03/19/20  0105   WBC 9.9 16.1* 16.1* 10.2   HGB 12.9* 13.0* 12.6* 15.7   MCV 91 92 93 89    153 148* 199    138 140 140   POTASSIUM 3.3* 3.6 3.4 3.5   CHLORIDE 107 109 112* 109   CO2 24 22 24 26   BUN 10 13 13 16   CR 1.06 1.10 1.24 1.29*   ANIONGAP 9 7 4 5   GALDINO 8.5 8.8 8.4* 9.7   GLC 85 94 115* 137*   ALBUMIN  --   --   --  3.8   PROTTOTAL  --   --   --  7.7   BILITOTAL  --   --   --  0.8   ALKPHOS  --   --   --  85   ALT  --   --   --  43    AST  --   --   --  28   LIPASE  --   --   --  143       No results found for this or any previous visit (from the past 24 hour(s)).

## 2020-03-22 NOTE — PROGRESS NOTES
COLON & RECTAL SURGERY  PROGRESS NOTE    March 21, 2020    SUBJECTIVE:  Reports improvement in abdominal pain. Taking less pain medication. Took in minimal clears yesterday, no appetite this morning. Passing flatus and small amount of stool    OBJECTIVE:  Temp:  [97.3  F (36.3  C)-98.6  F (37  C)] 98.5  F (36.9  C)  Pulse:  [81-92] 81  Heart Rate:  [83-89] 83  Resp:  [16-18] 18  BP: (120-136)/(63-97) 134/95  SpO2:  [92 %-96 %] 95 %    Intake/Output Summary (Last 24 hours) at 3/21/2020 1032  Last data filed at 3/21/2020 1005  Gross per 24 hour   Intake 1554 ml   Output 100 ml   Net 1454 ml       GENERAL:  Awake, alert, no acute distress  EXTREMITIES: Warm and well perfused, no edema   ABDOMEN:  Soft, moderate distension, tender to deep palpation in right mid abdomen, supra-pubic area    LABS:  Lab Results   Component Value Date    WBC 16.1 03/21/2020     Lab Results   Component Value Date    HGB 13.0 03/21/2020     Lab Results   Component Value Date    HCT 38.9 03/21/2020     Lab Results   Component Value Date     03/21/2020     Last Basic Metabolic Panel:  Lab Results   Component Value Date     03/21/2020      Lab Results   Component Value Date    POTASSIUM 3.6 03/21/2020     Lab Results   Component Value Date    CHLORIDE 109 03/21/2020     Lab Results   Component Value Date    GALDINO 8.8 03/21/2020     Lab Results   Component Value Date    CO2 22 03/21/2020     Lab Results   Component Value Date    BUN 13 03/21/2020     Lab Results   Component Value Date    CR 1.10 03/21/2020     Lab Results   Component Value Date    GLC 94 03/21/2020       ASSESSMENT/PLAN: Elier Godinez is a 45 year old male admitted with acute,perforated diverticulitis. Abdominal exam improving. Leukocytosis resolved.     Continue clear liquid diet as tolerated. Encouraged to take slow and stop if increased abdominal symptoms.  Continue IV abx. Leukocytosis has normalized.   If no improvement in abdominal exam over next 1-2 days, will  consider repeat CT. If worsening abdominal exam may still require operative intervention this admission.   PRN pain medication.  Decrease IVF  OOB, ambulate    For questions/paging, please contact the CRS office at [phone].    Nancy Cueva MD  [facility]   Office: [phone]  3/21/2020   10:32 AM

## 2020-03-22 NOTE — PLAN OF CARE
DATE & TIME: 3/21/2020 NOC shift  Cognitive Concerns/ Orientation : A&Ox4  BEHAVIOR & AGGRESSION TOOL COLOR: Green  CIWA SCORE: NA        ABNL VS/O2: VSS on RA  MOBILITY: Independent   PAIN MANAGMENT: Norco x 1 for abd pain with relief  DIET: Tolerating a clear liquid diet  BOWEL/BLADDER: Cont B/B. BS+4. Abd tender to touch to RLQ. No BM this shift. Denies nausea.  ABNL LAB/BG: WBC 16.1 and Hgb 13.0  DRAIN/DEVICES: PIV LR at 100cc/hr  TELEMETRY RHYTHM: NA  SKIN: Intact  TESTS/PROCEDURES: NA  D/C DAY/GOALS/PLACE: 1-2 days once tolerating diet, pain well controlled  OTHER IMPORTANT INFO: On IV Rocephin and Flagyl. [place]-rectal surgeon following.

## 2020-03-22 NOTE — PLAN OF CARE
DATE & TIME: 3/22/2020 1300  Cognitive Concerns/ Orientation : A&Ox4  BEHAVIOR & AGGRESSION TOOL COLOR: Green  CIWA SCORE: NA        ABNL VS/O2: /96, other VSS. On RA.  MOBILITY: Independent   PAIN MANAGMENT: Norco x 1 for abd pain with relief  DIET: Advanced to full liquid diet.  BOWEL/BLADDER: Cont B/B. BS+4, hypoactive. Abd tender to touch to RLQ. Has had 2 loose stools this shift.Denies nausea.  ABNL LAB/BG: WBC 9.9 , K+ 3.3, given potasium replacement.  DRAIN/DEVICES: PIV LR at 100cc/hr  TELEMETRY RHYTHM: NA  SKIN: Intact  TESTS/PROCEDURES: NA  D/C DAY/GOALS/PLACE: 1-2 days once tolerating diet, pain well controlled  OTHER IMPORTANT INFO: On IV Rocephin and Flagyl. [place]-rectal surgeon following.

## 2020-03-23 LAB
ANION GAP SERPL CALCULATED.3IONS-SCNC: 8 MMOL/L (ref 3–14)
BUN SERPL-MCNC: 8 MG/DL (ref 7–30)
CALCIUM SERPL-MCNC: 8.6 MG/DL (ref 8.5–10.1)
CHLORIDE SERPL-SCNC: 105 MMOL/L (ref 94–109)
CO2 SERPL-SCNC: 24 MMOL/L (ref 20–32)
CREAT SERPL-MCNC: 1.02 MG/DL (ref 0.66–1.25)
ERYTHROCYTE [DISTWIDTH] IN BLOOD BY AUTOMATED COUNT: 13.1 % (ref 10–15)
GFR SERPL CREATININE-BSD FRML MDRD: 89 ML/MIN/{1.73_M2}
GLUCOSE SERPL-MCNC: 90 MG/DL (ref 70–99)
HCT VFR BLD AUTO: 38.9 % (ref 40–53)
HGB BLD-MCNC: 13.2 G/DL (ref 13.3–17.7)
MCH RBC QN AUTO: 30.6 PG (ref 26.5–33)
MCHC RBC AUTO-ENTMCNC: 33.9 G/DL (ref 31.5–36.5)
MCV RBC AUTO: 90 FL (ref 78–100)
PLATELET # BLD AUTO: 193 10E9/L (ref 150–450)
POTASSIUM SERPL-SCNC: 3.5 MMOL/L (ref 3.4–5.3)
RBC # BLD AUTO: 4.32 10E12/L (ref 4.4–5.9)
SODIUM SERPL-SCNC: 137 MMOL/L (ref 133–144)
WBC # BLD AUTO: 8.3 10E9/L (ref 4–11)

## 2020-03-23 PROCEDURE — 12000011 ZZH R&B MS OVERFLOW

## 2020-03-23 PROCEDURE — 85027 COMPLETE CBC AUTOMATED: CPT | Performed by: HOSPITALIST

## 2020-03-23 PROCEDURE — 25000132 ZZH RX MED GY IP 250 OP 250 PS 637: Performed by: HOSPITALIST

## 2020-03-23 PROCEDURE — 99232 SBSQ HOSP IP/OBS MODERATE 35: CPT | Performed by: HOSPITALIST

## 2020-03-23 PROCEDURE — 36415 COLL VENOUS BLD VENIPUNCTURE: CPT | Performed by: HOSPITALIST

## 2020-03-23 PROCEDURE — 25800030 ZZH RX IP 258 OP 636: Performed by: HOSPITALIST

## 2020-03-23 PROCEDURE — 25000128 H RX IP 250 OP 636: Performed by: HOSPITALIST

## 2020-03-23 PROCEDURE — 80048 BASIC METABOLIC PNL TOTAL CA: CPT | Performed by: HOSPITALIST

## 2020-03-23 RX ADMIN — ACETAMINOPHEN 650 MG: 325 TABLET, FILM COATED ORAL at 15:46

## 2020-03-23 RX ADMIN — HYDROMORPHONE HYDROCHLORIDE 0.3 MG: 1 INJECTION, SOLUTION INTRAMUSCULAR; INTRAVENOUS; SUBCUTANEOUS at 04:10

## 2020-03-23 RX ADMIN — METRONIDAZOLE 500 MG: 500 INJECTION, SOLUTION INTRAVENOUS at 14:08

## 2020-03-23 RX ADMIN — SODIUM CHLORIDE, POTASSIUM CHLORIDE, SODIUM LACTATE AND CALCIUM CHLORIDE: 600; 310; 30; 20 INJECTION, SOLUTION INTRAVENOUS at 14:05

## 2020-03-23 RX ADMIN — METRONIDAZOLE 500 MG: 500 INJECTION, SOLUTION INTRAVENOUS at 21:05

## 2020-03-23 RX ADMIN — METRONIDAZOLE 500 MG: 500 INJECTION, SOLUTION INTRAVENOUS at 06:21

## 2020-03-23 RX ADMIN — HYDROCODONE BITARTRATE AND ACETAMINOPHEN 2 TABLET: 5; 325 TABLET ORAL at 07:53

## 2020-03-23 RX ADMIN — ACETAMINOPHEN 650 MG: 325 TABLET, FILM COATED ORAL at 20:58

## 2020-03-23 RX ADMIN — CEFTRIAXONE SODIUM 1 G: 1 INJECTION, POWDER, FOR SOLUTION INTRAMUSCULAR; INTRAVENOUS at 04:14

## 2020-03-23 ASSESSMENT — ACTIVITIES OF DAILY LIVING (ADL)
ADLS_ACUITY_SCORE: 10

## 2020-03-23 NOTE — PLAN OF CARE
DATE & TIME: 03/23/2020 [id number]    Cognitive Concerns/ Orientation : A&O x4   BEHAVIOR & AGGRESSION TOOL COLOR: Green   ABNL VS/O2: VSS on RA  MOBILITY: Independent in room   PAIN MANAGMENT: Complaint of abdominal pain. PRN IV Dilaudid given x1  DIET: Full liquid  BOWEL/BLADDER: Continent up to bathroom   ABNL LAB/BG: None  DRAIN/DEVICES: PIV infusing LR at 75ml/hr  TELEMETRY RHYTHM: NA  SKIN: Intact  TESTS/PROCEDURES: None  D/C DAY/GOALS/PLACE: 1-2 more days once tolerating diet and pain is controlled  OTHER IMPORTANT INFO: IV antibiotics

## 2020-03-23 NOTE — PLAN OF CARE
Transferred from 66, arrived about 1345. A&Ox4. VSS on RA. C/o abdominal pain 1/10, controlled w/ heat pack, declined medication. BS hypoactive but audible x4. Denies nausea. Tolerating clears. Ambulated in hallway and room. 1 episode loose BM. Voiding adequately w/o difficulty. Up ind. IVF infusing. IV antibiotic administered. CRS following. Continue to monitor and encourage ambulation.

## 2020-03-23 NOTE — PROGRESS NOTES
[facility]    Hospitalist Progress Note      Assessment & Plan   Elier Godinez is a 45 year old male who was admitted on 3/19/2020 for abdominal pain, found to have diverticulitis with microperforation, then due to worsening abdominal exam, repeat CT was performed and found worsening small bowel obstruction.    Acute sigmoid diverticulitis with microperforation:   Small bowel obstruction secondary to inflammatory change in the distal ileum  Presented with abd pain x1 day PTA. No N/V/D. No fevers chills. Found to have diverticulitis on imaging. Denies prior hx of diverticulitis per pt. CT with acute sigmoid diverticulitis with microperforation. No abscess noted. Developed leukocytosis 3/20 (10.2-->16.1), returned to normal on 3/22 (9.9). Repeat CT scan 3/20 showed worsening SBO. Changed to inpatient status 3/20  - Continue with IV Ceftriaxone and flagyl (day 5/10), will need overall a 10 day course of antibiotics.  - Continue IV hydration at 75 ml/hr  - Pain control  - Having occasional BMs that are kind of watery  - Did not tolerate fulls, went back down to clears, if worsening abdominal pain, decrease back to NPO with likely repeat CT A/P  - CRS consulted and recommend continued medical management     Acute kidney injury, improved  Presumed 2/2 dehydration and poor po intake. UA neg. Cr was 1.29 on admit  - Cr improved 3/21, now 1.02  - Continue IV fluids  - Repeat BMP in AM     Hypokalemia, improved  Secondary to poor PO intake.  - Replete 40meq KCl 3/22  - BMP in AM    Chronic latent HBV  Presumed vertical transmission. Recently diagnosed.   - Follows with GI.   - Continue outpatient follow up    DVT Prophylaxis: Low Risk/Ambulatory with no VTE prophylaxis indicated  Code Status: Full Code  Expected discharge: 24-48 hours, recommended to prior living arrangement once tolerating diet, pain well controlled    Lacie Maria, DO  Text Page (7am - 6pm)    Interval History   Patient seen and examined.  Lying in bed, resting. Required pain meds overnight (including IV) after trialing full liquids later in the day. No chest pain, shortness of breath, nausea, vomiting. Had BM that he described as more watery and dark.    -Data reviewed today: I reviewed all new labs and imaging results over the last 24 hours. I personally reviewed no new imaging or EKGs    Physical Exam   Temp: 99.3  F (37.4  C) Temp src: Oral BP: 130/86   Heart Rate: 76 Resp: 16 SpO2: 94 % O2 Device: None (Room air)    Vitals:    03/19/20 0057 03/19/20 0341   Weight: 63.5 kg (140 lb) 62.4 kg (137 lb 9.6 oz)     Vital Signs with Ranges  Temp:  [98  F (36.7  C)-99.7  F (37.6  C)] 99.3  F (37.4  C)  Heart Rate:  [61-76] 76  Resp:  [16-18] 16  BP: (127-138)/(86-97) 130/86  SpO2:  [92 %-95 %] 94 %  I/O last 3 completed shifts:  In: 1534 [P.O.:200; I.V.:1334]  Out: 100 [Urine:100]    Constitutional: Alert, cooperative, no acute distress, cooperative  Respiratory: Clear to auscultation bilaterally, no crackles or wheezing  Cardiovascular: Regular rate and rhythm, normal S1 and S2, and no murmur noted  GI: Bowel sounds present, mild distention, soft,  to palpation in lower quadrants  Skin/Integumen: No rashes, no cyanosis, no edema  Other:     Medications     lactated ringers 75 mL/hr at 03/22/20 1734       cefTRIAXone  1 g Intravenous Q24H     metroNIDAZOLE  500 mg Intravenous Q8H       Data   Recent Labs   Lab 03/23/20  0808 03/22/20  0837 03/21/20  0908  03/19/20  0105   WBC 8.3 9.9 16.1*   < > 10.2   HGB 13.2* 12.9* 13.0*   < > 15.7   MCV 90 91 92   < > 89    181 153   < > 199    140 138   < > 140   POTASSIUM 3.5 3.3* 3.6   < > 3.5   CHLORIDE 105 107 109   < > 109   CO2 24 24 22   < > 26   BUN 8 10 13   < > 16   CR 1.02 1.06 1.10   < > 1.29*   ANIONGAP 8 9 7   < > 5   GALDINO 8.6 8.5 8.8   < > 9.7   GLC 90 85 94   < > 137*   ALBUMIN  --   --   --   --  3.8   PROTTOTAL  --   --   --   --  7.7   BILITOTAL  --   --   --   --  0.8    ALKPHOS  --   --   --   --  85   ALT  --   --   --   --  43   AST  --   --   --   --  28   LIPASE  --   --   --   --  143    < > = values in this interval not displayed.       No results found for this or any previous visit (from the past 24 hour(s)).

## 2020-03-23 NOTE — PROGRESS NOTES
COLON [facility] RECTAL SURGERY  PROGRESS NOTE    March 23, 2020    SUBJECTIVE:  Passing small amounts of flatus.  Minimal stool.  Felt full after eat some creamy soup yesterday.  Feels bloated.  WBC 8.3    OBJECTIVE:  Temp:  [98  F (36.7  C)-99.7  F (37.6  C)] 99.3  F (37.4  C)  Heart Rate:  [61-76] 76  Resp:  [16-18] 16  BP: (127-138)/(86-97) 130/86  SpO2:  [92 %-95 %] 94 %    Intake/Output Summary (Last 24 hours) at 3/23/2020 0822  Last data filed at 3/22/2020 1900  Gross per 24 hour   Intake 1534 ml   Output 100 ml   Net 1434 ml       GENERAL:  Awake, alert, no acute distress, resting in bed  HEAD: Nomocephalic atraumatic  RESPIRATORY: unlabored breathing on room air   EXTREMITIES: warm and well perfused  ABDOMEN:  Soft, minimally tender, distended, no rebound or guarding    LABS:  Lab Results   Component Value Date    WBC 9.9 03/22/2020     Lab Results   Component Value Date    HGB 12.9 03/22/2020     Lab Results   Component Value Date    HCT 38.8 03/22/2020     Lab Results   Component Value Date     03/22/2020     Last Basic Metabolic Panel:  Lab Results   Component Value Date     03/22/2020      Lab Results   Component Value Date    POTASSIUM 3.3 03/22/2020     Lab Results   Component Value Date    CHLORIDE 107 03/22/2020     Lab Results   Component Value Date    GALDINO 8.5 03/22/2020     Lab Results   Component Value Date    CO2 24 03/22/2020     Lab Results   Component Value Date    BUN 10 03/22/2020     Lab Results   Component Value Date    CR 1.06 03/22/2020     Lab Results   Component Value Date    GLC 85 03/22/2020       ASSESSMENT/PLAN: Elier Godinez is a 45 year old male admitted with acute, perforated diverticulitis. Abdominal exam improving. Leukocytosis resolved.   - ambulate  - ok to have small amounts of clear liquids as tolerated; possibly advance to full liquids later today if feeling better  - await further return of bowel function   - continue antibiotics   - pain control   - may need to  repeat CT scan in next day or two if no significant improvement in abdominal pain         For questions/paging, please contact the CRS office at [phone].    Zoë Gordon PA-C  [facility]  Phone: [phone]      [facility] Attending Note    Patient seen and examined independently.  Agree with above assessment and plan.  Feeling a bit worse today. No nausea, small BM.  abd softly distended. Mild tenderness  Plan  Await further bowel function.   Clears for now.  Encourage ambulation  Continue IV abx.  Is worsens or fails to improve may need repeat imaging.    Roxana Rockwell MD  [facility]  Office Phone # [phone]

## 2020-03-23 NOTE — PROVIDER NOTIFICATION
Pt c/o increase in pain since advancing the diet to full liquid.  Norco given this AM for pain.  Will back off to clears.  MD notified.

## 2020-03-23 NOTE — PLAN OF CARE
DATE & TIME: 3/22/2020 [id number]  Cognitive Concerns/ Orientation : A&Ox4  BEHAVIOR & AGGRESSION TOOL COLOR: Green  CIWA SCORE: NA        ABNL VS/O2: VSS ex HTN on RA  MOBILITY: Independent   PAIN MANAGMENT: 1/10 pain: Available: tylenol, norco, dilaudid. Given norco 1x.  DIET: Tolerating a clear liquid diet  BOWEL/BLADDER: Cont B/B. Abd rounded, no tenderness.   No BM this shift. BM green/brown mucous 3x last shift.  ABNL LAB/BG: K 3.3 Replaced 40 last shift. AM recheck.  DRAIN/DEVICES: PIV LR at 75 ml/hr  TELEMETRY RHYTHM: NA  SKIN: Intact  TESTS/PROCEDURES: NA  D/C DAY/GOALS/PLACE: 1-2 days once tolerating diet, pain well controlled  OTHER IMPORTANT INFO: On IV Rocephin and Flagyl. [place]-rectal surgeon following.

## 2020-03-24 LAB
ANION GAP SERPL CALCULATED.3IONS-SCNC: 11 MMOL/L (ref 3–14)
BUN SERPL-MCNC: 9 MG/DL (ref 7–30)
CALCIUM SERPL-MCNC: 8.5 MG/DL (ref 8.5–10.1)
CHLORIDE SERPL-SCNC: 103 MMOL/L (ref 94–109)
CO2 SERPL-SCNC: 21 MMOL/L (ref 20–32)
CREAT SERPL-MCNC: 0.92 MG/DL (ref 0.66–1.25)
GFR SERPL CREATININE-BSD FRML MDRD: >90 ML/MIN/{1.73_M2}
GLUCOSE SERPL-MCNC: 82 MG/DL (ref 70–99)
POTASSIUM SERPL-SCNC: 3.7 MMOL/L (ref 3.4–5.3)
SODIUM SERPL-SCNC: 135 MMOL/L (ref 133–144)

## 2020-03-24 PROCEDURE — 25000132 ZZH RX MED GY IP 250 OP 250 PS 637: Performed by: HOSPITALIST

## 2020-03-24 PROCEDURE — 25800030 ZZH RX IP 258 OP 636: Performed by: HOSPITALIST

## 2020-03-24 PROCEDURE — 99232 SBSQ HOSP IP/OBS MODERATE 35: CPT | Performed by: HOSPITALIST

## 2020-03-24 PROCEDURE — 36415 COLL VENOUS BLD VENIPUNCTURE: CPT | Performed by: HOSPITALIST

## 2020-03-24 PROCEDURE — 12000011 ZZH R&B MS OVERFLOW

## 2020-03-24 PROCEDURE — 25000128 H RX IP 250 OP 636: Performed by: HOSPITALIST

## 2020-03-24 PROCEDURE — 80048 BASIC METABOLIC PNL TOTAL CA: CPT | Performed by: HOSPITALIST

## 2020-03-24 RX ADMIN — METRONIDAZOLE 500 MG: 500 INJECTION, SOLUTION INTRAVENOUS at 21:54

## 2020-03-24 RX ADMIN — HYDROCODONE BITARTRATE AND ACETAMINOPHEN 2 TABLET: 5; 325 TABLET ORAL at 05:36

## 2020-03-24 RX ADMIN — HYDROCODONE BITARTRATE AND ACETAMINOPHEN 2 TABLET: 5; 325 TABLET ORAL at 18:31

## 2020-03-24 RX ADMIN — METRONIDAZOLE 500 MG: 500 INJECTION, SOLUTION INTRAVENOUS at 05:30

## 2020-03-24 RX ADMIN — SODIUM CHLORIDE, POTASSIUM CHLORIDE, SODIUM LACTATE AND CALCIUM CHLORIDE: 600; 310; 30; 20 INJECTION, SOLUTION INTRAVENOUS at 06:27

## 2020-03-24 RX ADMIN — CEFTRIAXONE SODIUM 1 G: 1 INJECTION, POWDER, FOR SOLUTION INTRAMUSCULAR; INTRAVENOUS at 03:56

## 2020-03-24 RX ADMIN — METRONIDAZOLE 500 MG: 500 INJECTION, SOLUTION INTRAVENOUS at 14:11

## 2020-03-24 ASSESSMENT — ACTIVITIES OF DAILY LIVING (ADL)
ADLS_ACUITY_SCORE: 10

## 2020-03-24 NOTE — PROGRESS NOTES
[facility]    Hospitalist Progress Note      Assessment & Plan   Elier Godinez is a 45 year old male who was admitted on 3/19/2020 for abdominal pain, found to have diverticulitis with microperforation, then due to worsening abdominal exam, repeat CT was performed and found worsening small bowel obstruction.    Acute sigmoid diverticulitis with microperforation:   Small bowel obstruction secondary to inflammatory change in the distal ileum  Presented with abd pain x1 day PTA. No N/V/D. No fevers chills. Found to have diverticulitis on imaging. Denies prior hx of diverticulitis per pt. CT with acute sigmoid diverticulitis with microperforation. No abscess noted. Developed leukocytosis 3/20 (10.2-->16.1), returned to normal on 3/22 (9.9). Repeat CT scan 3/20 showed worsening SBO. Changed to inpatient status 3/20  - Continue with IV Ceftriaxone and flagyl (day 6/10), will need overall a 10 day course of antibiotics.  - Continue IV hydration at 75 ml/hr  - Pain control  - Having occasional BMs that are kind of watery  - CRS consulted and recommend continued medical management  -As he is tolerating clear liquid diet, will advance to full liquid diet and monitor today.     Acute kidney injury, improved  Presumed 2/2 dehydration and poor po intake. UA neg. Cr was 1.29 on admit  - Cr improved 3/21, now 1.02  - Continue IV fluids     Hypokalemia, improved  Secondary to poor PO intake.  - Replete as needed    Chronic latent HBV  Presumed vertical transmission. Recently diagnosed.   - Follows with GI.   - Continue outpatient follow up    DVT Prophylaxis: Low Risk/Ambulatory with no VTE prophylaxis indicated  Code Status: Full Code  Expected discharge: Possibly tomorrow , recommended to prior living arrangement once tolerating diet, pain well controlled    Karla Wilkins MD  Text Page (7am - 6pm)    Interval History   Patient seen and examined.  Doing better today with less abdominal pain.  Tolerating clear  liquid diet.  Is anxious to discharge home.  Minimal nausea, no vomiting.    -Data reviewed today: I reviewed all new labs and imaging results over the last 24 hours. I personally reviewed no new imaging or EKGs    Physical Exam   Temp: 96  F (35.6  C) Temp src: Oral BP: (!) 119/93 Pulse: 66 Heart Rate: 97 Resp: 16 SpO2: 94 % O2 Device: None (Room air)    Vitals:    03/19/20 0057 03/19/20 0341   Weight: 63.5 kg (140 lb) 62.4 kg (137 lb 9.6 oz)     Vital Signs with Ranges  Temp:  [96  F (35.6  C)-97.7  F (36.5  C)] 96  F (35.6  C)  Pulse:  [66-82] 66  Heart Rate:  [74-97] 97  Resp:  [16] 16  BP: (115-139)/(74-93) 119/93  SpO2:  [94 %-98 %] 94 %  I/O last 3 completed shifts:  In: 650 [I.V.:650]  Out: -     Constitutional: Alert, cooperative, no acute distress, cooperative  Respiratory: Nonlabored breathing  Cardiovascular: RRR  GI: Abdomen mildly distended  Skin/Integumen: No rashes, no cyanosis, no edema  Other:     Medications     lactated ringers 75 mL/hr at 03/24/20 0627       cefTRIAXone  1 g Intravenous Q24H     metroNIDAZOLE  500 mg Intravenous Q8H       Data   Recent Labs   Lab 03/24/20  0645 03/23/20  0808 03/22/20  0837 03/21/20  0908  03/19/20  0105   WBC  --  8.3 9.9 16.1*   < > 10.2   HGB  --  13.2* 12.9* 13.0*   < > 15.7   MCV  --  90 91 92   < > 89   PLT  --  193 181 153   < > 199    137 140 138   < > 140   POTASSIUM 3.7 3.5 3.3* 3.6   < > 3.5   CHLORIDE 103 105 107 109   < > 109   CO2 21 24 24 22   < > 26   BUN 9 8 10 13   < > 16   CR 0.92 1.02 1.06 1.10   < > 1.29*   ANIONGAP 11 8 9 7   < > 5   GALDINO 8.5 8.6 8.5 8.8   < > 9.7   GLC 82 90 85 94   < > 137*   ALBUMIN  --   --   --   --   --  3.8   PROTTOTAL  --   --   --   --   --  7.7   BILITOTAL  --   --   --   --   --  0.8   ALKPHOS  --   --   --   --   --  85   ALT  --   --   --   --   --  43   AST  --   --   --   --   --  28   LIPASE  --   --   --   --   --  143    < > = values in this interval not displayed.       No results found for this or  any previous visit (from the past 24 hour(s)).

## 2020-03-24 NOTE — PLAN OF CARE
Pt is A&Ox4, VSS on RA, IND, Clear liquid diet. Pt reports of ABD pain at 2 out of 10, managed with prn tylenol, Denies nausea and vomiting. Pt is on continuous I.V. run, IV antibiotic administered, Reports of 1 loose BM, voiding adequately, CRS and GI following,

## 2020-03-24 NOTE — PROGRESS NOTES
COLON & RECTAL SURGERY  PROGRESS NOTE    March 24, 2020    SUBJECTIVE:  Feels a bit better today.  Some lower abdominal pain but improved.  Feels like he needed less pain medication yesterday.  Tolerating clear liquid diet.  Passing loose stools and more flatus.  No nausea.      OBJECTIVE:  Temp:  [96.6  F (35.9  C)-97.7  F (36.5  C)] 96.8  F (36  C)  Pulse:  [66-82] 66  Heart Rate:  [74-82] 75  Resp:  [16] 16  BP: (115-139)/(74-92) 118/87  SpO2:  [95 %-98 %] 96 %    Intake/Output Summary (Last 24 hours) at 3/24/2020 0841  Last data filed at 3/24/2020 0627  Gross per 24 hour   Intake 650 ml   Output --   Net 650 ml       GENERAL:  Awake, alert, no acute distress, resting in bed, appears comfortable   HEAD: Nomocephalic atraumatic  RESPIRATORY: unlabored breathing on room air   EXTREMITIES: warm and well perfused  ABDOMEN:  Soft, minimal tenderness in LLQ and suprapubic region, non-distended, no rebound or guarding    LABS:  Lab Results   Component Value Date    WBC 8.3 03/23/2020     Lab Results   Component Value Date    HGB 13.2 03/23/2020     Lab Results   Component Value Date    HCT 38.9 03/23/2020     Lab Results   Component Value Date     03/23/2020     Last Basic Metabolic Panel:  Lab Results   Component Value Date     03/24/2020      Lab Results   Component Value Date    POTASSIUM 3.7 03/24/2020     Lab Results   Component Value Date    CHLORIDE 103 03/24/2020     Lab Results   Component Value Date    GALDINO 8.5 03/24/2020     Lab Results   Component Value Date    CO2 21 03/24/2020     Lab Results   Component Value Date    BUN 9 03/24/2020     Lab Results   Component Value Date    CR 0.92 03/24/2020     Lab Results   Component Value Date    GLC 82 03/24/2020       ASSESSMENT/PLAN: Elier Godinez is a 45 year old male admitted with acute, perforated diverticulitis. Abdominal exam improving. Leukocytosis resolved.   - continue clear liquids for now, possibly advance diet later if continuing to do  well  - ambulate  - continue IV antibiotics   - discussed with Dr. Rockwell       For questions/paging, please contact the CRS office at [phone].    Zoë Gordon PA-C  [facility]  Phone: [phone]

## 2020-03-24 NOTE — PROGRESS NOTES
A&O. VSS on RA. Up ambulating independently. Pt rating minimal pain today. Tolerating clear liquid diet for breakfast. Abdomen rounded/ soft. Passing flatus.  Voiding adequately. IVF infusing. Possible discharge home tomorrow.

## 2020-03-24 NOTE — PLAN OF CARE
AVSS; Norco x 1 for back/abdominal pain; tolerating clear liquid diet; + flatus; + loose/liquid dark green/brown stools x 2 overnight; abomen rounded, but soft; IV LR @ 75 ml/hr; pt continues on IV flagyl and rocephin; voiding; up independently; encourage ambulation; Colon Rectal Surgery consulted.

## 2020-03-25 PROCEDURE — 25800030 ZZH RX IP 258 OP 636: Performed by: HOSPITALIST

## 2020-03-25 PROCEDURE — 99232 SBSQ HOSP IP/OBS MODERATE 35: CPT | Performed by: HOSPITALIST

## 2020-03-25 PROCEDURE — 25000132 ZZH RX MED GY IP 250 OP 250 PS 637: Performed by: HOSPITALIST

## 2020-03-25 PROCEDURE — 25000128 H RX IP 250 OP 636: Performed by: HOSPITALIST

## 2020-03-25 PROCEDURE — 12000011 ZZH R&B MS OVERFLOW

## 2020-03-25 RX ADMIN — SODIUM CHLORIDE, POTASSIUM CHLORIDE, SODIUM LACTATE AND CALCIUM CHLORIDE: 600; 310; 30; 20 INJECTION, SOLUTION INTRAVENOUS at 13:01

## 2020-03-25 RX ADMIN — HYDROCODONE BITARTRATE AND ACETAMINOPHEN 2 TABLET: 5; 325 TABLET ORAL at 10:22

## 2020-03-25 RX ADMIN — METRONIDAZOLE 500 MG: 500 INJECTION, SOLUTION INTRAVENOUS at 21:48

## 2020-03-25 RX ADMIN — METRONIDAZOLE 500 MG: 500 INJECTION, SOLUTION INTRAVENOUS at 05:32

## 2020-03-25 RX ADMIN — HYDROCODONE BITARTRATE AND ACETAMINOPHEN 2 TABLET: 5; 325 TABLET ORAL at 21:51

## 2020-03-25 RX ADMIN — HYDROCODONE BITARTRATE AND ACETAMINOPHEN 2 TABLET: 5; 325 TABLET ORAL at 02:19

## 2020-03-25 RX ADMIN — HYDROCODONE BITARTRATE AND ACETAMINOPHEN 2 TABLET: 5; 325 TABLET ORAL at 17:21

## 2020-03-25 RX ADMIN — CEFTRIAXONE SODIUM 1 G: 1 INJECTION, POWDER, FOR SOLUTION INTRAMUSCULAR; INTRAVENOUS at 04:08

## 2020-03-25 RX ADMIN — METRONIDAZOLE 500 MG: 500 INJECTION, SOLUTION INTRAVENOUS at 13:55

## 2020-03-25 ASSESSMENT — ACTIVITIES OF DAILY LIVING (ADL)
ADLS_ACUITY_SCORE: 10

## 2020-03-25 NOTE — PLAN OF CARE
AVSS; abdominal discomfort controlled with warm packs and Norco; abdomen slightly distended and slightly firm; pt stated he is only having a few dark green/brown loose stools; + flatus; IV LR at 75 ml/hr; pt stated he feels he tolerated his full liquid diet a fair amount; continues on IV Rocephin and IV Flagyl; Colorectal Surgery consulted.

## 2020-03-25 NOTE — PLAN OF CARE
VSS on RA. Pain controlled with PRN Norco. +gas. Denies nausea. Full liquid diet, GI might advance this evening. UAL. LR @75cc/hr. IV abx. Possible discharge tomorrow. Continue to monitor.

## 2020-03-25 NOTE — PROGRESS NOTES
A/OX4. VSS on RA. Abdomen slightly distended, tender to touch,BS hypo, +flatus, BM this shift. Full liquids, appetite is poor. IVF as ordered. Ambulating in the hallway independently.

## 2020-03-25 NOTE — PROVIDER NOTIFICATION
MD Notification     Notified Person: Karla Castanon    Notified Person Name: Karla Wilkins    Notification Date/Time: 3/25/2020, 4:12 pm    Notification Interaction: Text Paged     Purpose of Notification: Diet advancement to regular    Orders Received: Advanced to Regular Diet     Comments:

## 2020-03-25 NOTE — PROGRESS NOTES
[facility]    Hospitalist Progress Note      Assessment & Plan   Elier Godinez is a 45 year old male who was admitted on 3/19/2020 for abdominal pain, found to have diverticulitis with microperforation, then due to worsening abdominal exam, repeat CT was performed and found worsening small bowel obstruction.    Acute sigmoid diverticulitis with microperforation:   Small bowel obstruction secondary to inflammatory change in the distal ileum  Presented with abd pain x1 day PTA. No N/V/D. No fevers chills. Found to have diverticulitis on imaging. Denies prior hx of diverticulitis per pt. CT with acute sigmoid diverticulitis with microperforation. No abscess noted. Developed leukocytosis 3/20 (10.2-->16.1), returned to normal on 3/22 (9.9). Repeat CT scan 3/20 showed worsening SBO. Changed to inpatient status 3/20  - Continue with IV Ceftriaxone and flagyl (day 7/10), will need overall a 10 day course of antibiotics.  - Continue IV hydration at 75 ml/hr  - Pain control  - Having occasional BMs that are kind of watery  - CRS consulted and recommend continued medical management  -Tolerating FLD. May advance to low residue diet later today if continues to do well.      Acute kidney injury, improved  Presumed 2/2 dehydration and poor po intake. UA neg. Cr was 1.29 on admit  - Cr improved 3/21, now 1.02  - Continue IV fluids     Hypokalemia, improved  Secondary to poor PO intake.  - Replete as needed    Chronic latent HBV  Presumed vertical transmission. Recently diagnosed.   - Follows with GI.   - Continue outpatient follow up    DVT Prophylaxis: Low Risk/Ambulatory with no VTE prophylaxis indicated  Code Status: Full Code  Expected discharge: Possibly tomorrow , recommended to prior living arrangement once tolerating diet, pain well controlled    Karla Wilkins MD  Text Page (7am - 6pm)    Interval History   Patient seen and examined.  Doing better today with less abdominal pain.  Tolerating full  liquid  diet.   Minimal nausea, no vomiting.    -Data reviewed today: I reviewed all new labs and imaging results over the last 24 hours. I personally reviewed no new imaging or EKGs    Physical Exam   Temp: 96.3  F (35.7  C) Temp src: Oral BP: 110/81   Heart Rate: 73 Resp: 16 SpO2: 95 % O2 Device: None (Room air)    Vitals:    03/19/20 0057 03/19/20 0341   Weight: 63.5 kg (140 lb) 62.4 kg (137 lb 9.6 oz)     Vital Signs with Ranges  Temp:  [96.3  F (35.7  C)-97.9  F (36.6  C)] 96.3  F (35.7  C)  Heart Rate:  [61-73] 73  Resp:  [16] 16  BP: (110-134)/(74-91) 110/81  SpO2:  [93 %-95 %] 95 %  I/O last 3 completed shifts:  In: 1340 [P.O.:740; I.V.:600]  Out: -     Constitutional: Alert, cooperative, no acute distress, cooperative  Respiratory: Nonlabored breathing  Cardiovascular: RRR  GI: Abdomen mildly distended  Skin/Integumen: No rashes, no cyanosis, no edema  Other:     Medications     lactated ringers 75 mL/hr at 03/25/20 0000       cefTRIAXone  1 g Intravenous Q24H     metroNIDAZOLE  500 mg Intravenous Q8H       Data   Recent Labs   Lab 03/24/20  0645 03/23/20  0808 03/22/20  0837 03/21/20  0908  03/19/20  0105   WBC  --  8.3 9.9 16.1*   < > 10.2   HGB  --  13.2* 12.9* 13.0*   < > 15.7   MCV  --  90 91 92   < > 89   PLT  --  193 181 153   < > 199    137 140 138   < > 140   POTASSIUM 3.7 3.5 3.3* 3.6   < > 3.5   CHLORIDE 103 105 107 109   < > 109   CO2 21 24 24 22   < > 26   BUN 9 8 10 13   < > 16   CR 0.92 1.02 1.06 1.10   < > 1.29*   ANIONGAP 11 8 9 7   < > 5   GALDINO 8.5 8.6 8.5 8.8   < > 9.7   GLC 82 90 85 94   < > 137*   ALBUMIN  --   --   --   --   --  3.8   PROTTOTAL  --   --   --   --   --  7.7   BILITOTAL  --   --   --   --   --  0.8   ALKPHOS  --   --   --   --   --  85   ALT  --   --   --   --   --  43   AST  --   --   --   --   --  28   LIPASE  --   --   --   --   --  143    < > = values in this interval not displayed.       No results found for this or any previous visit (from the past 24 hour(s)).

## 2020-03-25 NOTE — PROGRESS NOTES
COLON & RECTAL SURGERY  PROGRESS NOTE    March 25, 2020    SUBJECTIVE:  Pt resting in bed on arrival. Denies any pain currently. Tolerating fulls but taking it slow. Passing flatus and stool.     OBJECTIVE:  Temp:  [96  F (35.6  C)-97.9  F (36.6  C)] 96.3  F (35.7  C)  Heart Rate:  [61-97] 73  Resp:  [16] 16  BP: (110-134)/(74-93) 110/81  SpO2:  [93 %-95 %] 95 %    Intake/Output Summary (Last 24 hours) at 3/25/2020 0910  Last data filed at 3/25/2020 0600  Gross per 24 hour   Intake 1090 ml   Output --   Net 1090 ml       GENERAL:  Awake, alert, no acute distress  HEAD: Normocephalic atraumatic  SCLERA: Anicteric  EXTREMITIES: Warm and well perfused  ABDOMEN:  Soft, non-tender, non-distended. No guarding, rigidity, or peritoneal signs.     LABS:  Lab Results   Component Value Date    WBC 8.3 03/23/2020     Lab Results   Component Value Date    HGB 13.2 03/23/2020     Lab Results   Component Value Date    HCT 38.9 03/23/2020     Lab Results   Component Value Date     03/23/2020     Last Basic Metabolic Panel:  Lab Results   Component Value Date     03/24/2020      Lab Results   Component Value Date    POTASSIUM 3.7 03/24/2020     Lab Results   Component Value Date    CHLORIDE 103 03/24/2020     Lab Results   Component Value Date    GALDINO 8.5 03/24/2020     Lab Results   Component Value Date    CO2 21 03/24/2020     Lab Results   Component Value Date    BUN 9 03/24/2020     Lab Results   Component Value Date    CR 0.92 03/24/2020     Lab Results   Component Value Date    GLC 82 03/24/2020       ASSESSMENT/PLAN: 45 year old male admitted with acute, perforated diverticulitis. Abdominal exam improving. Leukocytosis resolved.     1. Continue full liquids for now, possibly advance diet to low fiber later if continuing to improve  2. OOB and ambulate  3. Continue IV antibiotics   4. No plans for surgical intervention at this time. Will continue to follow.   5. Patient seen and discussed with Dr. Gayle.    For  questions/paging, please contact the CRS office at [phone].    Sandie Sloan PA-C  [facility]  Phone: [phone]      [facility] Staff  I performed a history and physical examination of the patient and discussed their management with the physician assistant. I reviewed the physician assistants note and agree with the documented findings and plan of care.     Doing ok. Tolerating fulls.  + flatus and BM.  Poor po intake as early satiety.  Not ready to adv diet.  AVSS.    Exam:   Abd soft, NT, ND    Plan:  Cont fulls, may adv diet later if doing well  Cont ABx    Demetrice Gayle MD  [facility]  Office Phone # [phone].

## 2020-03-26 VITALS
HEART RATE: 65 BPM | BODY MASS INDEX: 22.11 KG/M2 | RESPIRATION RATE: 16 BRPM | WEIGHT: 137.6 LBS | SYSTOLIC BLOOD PRESSURE: 126 MMHG | HEIGHT: 66 IN | OXYGEN SATURATION: 94 % | DIASTOLIC BLOOD PRESSURE: 88 MMHG | TEMPERATURE: 96.4 F

## 2020-03-26 PROCEDURE — 25000128 H RX IP 250 OP 636: Performed by: HOSPITALIST

## 2020-03-26 PROCEDURE — 99238 HOSP IP/OBS DSCHRG MGMT 30/<: CPT | Performed by: HOSPITALIST

## 2020-03-26 PROCEDURE — 25000132 ZZH RX MED GY IP 250 OP 250 PS 637: Performed by: HOSPITALIST

## 2020-03-26 RX ORDER — CEFUROXIME AXETIL 500 MG/1
500 TABLET ORAL 2 TIMES DAILY
Qty: 14 TABLET | Refills: 0 | Status: SHIPPED | OUTPATIENT
Start: 2020-03-26 | End: 2020-04-02

## 2020-03-26 RX ORDER — HYDROCODONE BITARTRATE AND ACETAMINOPHEN 5; 325 MG/1; MG/1
1-2 TABLET ORAL EVERY 4 HOURS PRN
Qty: 20 TABLET | Refills: 0 | Status: ON HOLD | OUTPATIENT
Start: 2020-03-26 | End: 2020-05-18

## 2020-03-26 RX ORDER — METRONIDAZOLE 500 MG/1
500 TABLET ORAL 3 TIMES DAILY
Qty: 21 TABLET | Refills: 0 | Status: ON HOLD | OUTPATIENT
Start: 2020-03-26 | End: 2020-05-18

## 2020-03-26 RX ORDER — ACETAMINOPHEN 325 MG/1
650 TABLET ORAL EVERY 4 HOURS PRN
Refills: 0 | Status: ON HOLD | COMMUNITY
Start: 2020-03-26 | End: 2020-05-18

## 2020-03-26 RX ADMIN — CEFTRIAXONE SODIUM 1 G: 1 INJECTION, POWDER, FOR SOLUTION INTRAMUSCULAR; INTRAVENOUS at 03:54

## 2020-03-26 RX ADMIN — METRONIDAZOLE 500 MG: 500 INJECTION, SOLUTION INTRAVENOUS at 06:25

## 2020-03-26 RX ADMIN — HYDROCODONE BITARTRATE AND ACETAMINOPHEN 2 TABLET: 5; 325 TABLET ORAL at 02:21

## 2020-03-26 ASSESSMENT — ACTIVITIES OF DAILY LIVING (ADL)
ADLS_ACUITY_SCORE: 10

## 2020-03-26 NOTE — DISCHARGE SUMMARY
Discharge Summary  Hospitalist    Date of Admission:  3/19/2020  Date of Discharge:  3/26/2020  Discharging Provider: Karla Wilkins MD  Date of Service (when I saw the patient): 03/26/20    Discharge Diagnoses   Acute sigmoid diverticulitis with microperforation:   Small bowel obstruction secondary to inflammatory change in the distal ileum  Acute kidney injury, improved      History of Present Illness   Please refer H & P for details.      Hospital Course   Elier Godinez is a 45 year old male who was admitted on 3/19/2020 for abdominal pain, found to have diverticulitis with microperforation, then due to worsening abdominal exam, repeat CT was performed and found worsening small bowel obstruction.     Acute sigmoid diverticulitis with microperforation:   Small bowel obstruction secondary to inflammatory change in the distal ileum  Presented with abd pain x1 day PTA. No N/V/D. No fevers chills. Found to have diverticulitis on imaging. Denies prior hx of diverticulitis per pt. CT with acute sigmoid diverticulitis with microperforation. No abscess noted. Developed leukocytosis 3/20 (10.2-->16.1), returned to normal on 3/22 (9.9). Repeat CT scan 3/20 showed worsening SBO. Changed to inpatient status 3/20  - Treated with IV Ceftriaxone and flagyl, switched to oral cefuroxime and flagyl at discharge to complete 14 days total course.  - ivf  - Pain control with prn Norco  - CRS consulted and recommend continued medical management  -Kept NPO intially, then slowly advanced to low residue diet that he tolerated well by day of discharge.  -Will need colonoscopy in 6 - 8 weeks.      Acute kidney injury, improved  Presumed 2/2 dehydration and poor po intake. UA neg. Cr was 1.29 on admit  - Cr improved 3/21 with ivf, now 1.02       Hypokalemia, improved  Secondary to poor PO intake.  - Repleted as needed     Chronic latent HBV  Presumed vertical transmission. Recently diagnosed.   - Follows with GI.   - Continue outpatient  follow up      Karla Wilkins MD, MD      Pending Results   These results will be followed up by Hospitalist team.  Unresulted Labs Ordered in the Past 30 Days of this Admission     No orders found from 2/18/2020 to 3/20/2020.          Code Status   Full Code       Primary Care Physician   Pedro York    Follow-ups Needed After Discharge   Follow-up Appointments     Follow-up and recommended labs and tests      Call our office at [facility] at [phone] with any   questions or concerns.  We recommend a colonoscopy in 6-8 weeks and we   will help set that up for you.  Depending on the state of this pandemic,   this colonoscopy may need to be delayed a bit.      Take the full course of antibiotics.  Once you are done with the   antibiotics, you should return to a regular diet and aim for 25 grams of   fiber per day (look for foods like beans, bran, whole wheat products,   berries, etc.).  Make sure you are drinking at least 8 glasses of water   per day.         Follow-up and recommended labs and tests       Follow up with primary care provider, Pedro York, within 7   days for hospital follow- up.  No follow up labs or test are needed. Will   need colonoscopy in 6 - 8 weeks.             Physical Exam   Temp: 96.4  F (35.8  C) Temp src: Oral BP: 126/88 Pulse: 65 Heart Rate: 71 Resp: 16 SpO2: 94 % O2 Device: None (Room air)    Vitals:    03/19/20 0057 03/19/20 0341   Weight: 63.5 kg (140 lb) 62.4 kg (137 lb 9.6 oz)     Vital Signs with Ranges  Temp:  [95.9  F (35.5  C)-97  F (36.1  C)] 96.4  F (35.8  C)  Pulse:  [64-65] 65  Heart Rate:  [71-99] 71  Resp:  [16] 16  BP: (101-126)/(67-88) 126/88  SpO2:  [94 %-98 %] 94 %  I/O last 3 completed shifts:  In: 180 [P.O.:180]  Out: -     Constitutional: Alert, cooperative, no acute distress, cooperative  Respiratory: Nonlabored breathing  Cardiovascular: RRR  GI: Abdomen mildly distended  Skin/Integumen: No rashes, no cyanosis, no  edema  Other:        Discharge Disposition   Discharged to home  Condition at discharge: Stable    Consultations This Hospital Stay   SURGERY GENERAL IP CONSULT  SURGERY GENERAL IP CONSULT  COLORECTAL SURGERY IP CONSULT    Time Spent on this Encounter   IKarla MD, personally saw the patient today and spent less than or equal to 30 minutes discharging this patient.    Discharge Orders      Follow-up and recommended labs and tests    Call our office at [facility] at [phone] with any questions or concerns.  We recommend a colonoscopy in 6-8 weeks and we will help set that up for you.  Depending on the state of this pandemic, this colonoscopy may need to be delayed a bit.      Take the full course of antibiotics.  Once you are done with the antibiotics, you should return to a regular diet and aim for 25 grams of fiber per day (look for foods like beans, bran, whole wheat products, berries, etc.).  Make sure you are drinking at least 8 glasses of water per day.     Reason for your hospital stay    You were hospitalized with perforated diverticulitis that was managed with antibiotics , bowel rest and pain medicines.     Follow-up and recommended labs and tests     Follow up with primary care provider, Pedro York, within 7 days for hospital follow- up.  No follow up labs or test are needed. Will need colonoscopy in 6 - 8 weeks.     Activity    Your activity upon discharge: activity as tolerated     When to contact your care team    Call your primary doctor if you have any of the following: worsening abdominal pain, nausea, vomiting, fever     Full Code     Diet    LOW FIBER DIET  A low fiber diet helps to decrease size and frequency of stools    Avoid nuts, seeds, raw vegetables.     Try foods such as:  - beef, poultry, fish, eggs, smooth peanut butter, dairy (as tolerated)  - refined or white flour products (like white bread, white pasta, etc)    ** Return to a high fiber diet  after 2 weeks.     Diet    Follow this diet upon discharge: Orders Placed This Encounter      Regular Diet Adult      Diet     Discharge Medications   Current Discharge Medication List      START taking these medications    Details   acetaminophen (TYLENOL) 325 MG tablet Take 2 tablets (650 mg) by mouth every 4 hours as needed for mild pain  Qty:  , Refills: 0    Associated Diagnoses: Diverticulitis      cefuroxime (CEFTIN) 500 MG tablet Take 1 tablet (500 mg) by mouth 2 times daily for 7 days  Qty: 14 tablet, Refills: 0    Associated Diagnoses: Diverticulitis      HYDROcodone-acetaminophen (NORCO) 5-325 MG tablet Take 1-2 tablets by mouth every 4 hours as needed  Qty: 20 tablet, Refills: 0    Associated Diagnoses: Diverticulitis      metroNIDAZOLE (FLAGYL) 500 MG tablet Take 1 tablet (500 mg) by mouth 3 times daily for 7 days  Qty: 21 tablet, Refills: 0    Associated Diagnoses: Diverticulitis           Allergies   No Known Allergies  Data   Most Recent 3 CBC's:  Recent Labs   Lab Test 03/23/20  0808 03/22/20  0837 03/21/20  0908   WBC 8.3 9.9 16.1*   HGB 13.2* 12.9* 13.0*   MCV 90 91 92    181 153      Most Recent 3 BMP's:  Recent Labs   Lab Test 03/24/20  0645 03/23/20  0808 03/22/20  0837    137 140   POTASSIUM 3.7 3.5 3.3*   CHLORIDE 103 105 107   CO2 21 24 24   BUN 9 8 10   CR 0.92 1.02 1.06   ANIONGAP 11 8 9   GALDINO 8.5 8.6 8.5   GLC 82 90 85     Most Recent 2 LFT's:  Recent Labs   Lab Test 03/19/20  0105   AST 28   ALT 43   ALKPHOS 85   BILITOTAL 0.8     Most Recent INR's and Anticoagulation Dosing History:  Anticoagulation Dose History     There is no flowsheet data to display.        Most Recent 3 Troponin's:No lab results found.  Most Recent Cholesterol Panel:No lab results found.  Most Recent 6 Bacteria Isolates From Any Culture (See EPIC Reports for Culture Details):No lab results found.  Most Recent TSH, T4 and A1c Labs:No lab results found.    Results for orders placed or performed during  the hospital encounter of 03/19/20   CT Abdomen Pelvis w Contrast    Narrative    EXAM: CT ABDOMEN PELVIS W CONTRAST  LOCATION: [facility]  DATE/TIME: 3/19/2020 1:49 AM    INDICATION: Abdominal pain with rebound tenderness.  COMPARISON: None.  TECHNIQUE: CT scan of the abdomen and pelvis was performed following injection of IV contrast. Multiplanar reformats were obtained. Dose reduction techniques were used.  CONTRAST: 71 mL Isovue-370.    FINDINGS:   LOWER CHEST: Normal.    HEPATOBILIARY: Normal.    PANCREAS: Normal.    SPLEEN: Normal.    ADRENAL GLANDS: Normal.    KIDNEYS/BLADDER: Tiny renal lesions too small to characterize definitively though likely benign.    BOWEL: Diverticulosis with sigmoid colon wall thickening, adjacent inflammatory change and small bubbles of extraluminal gas consistent with perforated diverticulitis. Inflammatory changes involve adjacent loops of small bowel. No significant abscess at   this time. No bowel obstruction.    LYMPH NODES: Normal.    VASCULATURE: Unremarkable.    PELVIC ORGANS: Normal.    MUSCULOSKELETAL: Tiny fat-containing paraumbilical hernia. Degenerative disease. Presumed bone islands.      Impression    IMPRESSION:   1.  Evidence for acute sigmoid diverticulitis with microperforation. No abscess at this time. Adjacent loops of small bowel likely secondarily inflamed. No obstruction. If not performed per routine screening guidelines, GI or surgical consult recommended   to consider colonoscopy and exclude other underlying pathology.    Results called to Amie De Jesus at 2:04 AM.     CT Abdomen Pelvis w Contrast    Narrative    CT ABDOMEN AND PELVIS WITH CONTRAST 3/20/2020 9:45 AM     HISTORY: Abdominal pain, fever, abscess suspected.    COMPARISON: March 19, 2020    TECHNIQUE: Volumetric helical acquisition of CT images from the lung  bases through the symphysis pubis after the administration of 69 mL  Isovue-370 intravenous contrast. Radiation dose for  this scan was  reduced using automated exposure control, adjustment of the mA and/or  kV according to patient size, or iterative reconstruction technique.    FINDINGS: Mechanical small bowel obstruction secondary to inflamed  distal ileum. No abscess demonstrated. Stranding and minimal free  layering fluid present. Tiny amount of free intraperitoneal air  similar to previous study. The liver, spleen, adrenal glands, kidneys,  and pancreas demonstrate no worrisome focal lesion. Mild bibasilar  atelectasis and/or infiltrate. No definite effusions. No  hydronephrosis. Normal caliber aorta. Gallbladder unremarkable.      Impression    IMPRESSION: Worsening small bowel obstruction secondary to  inflammatory change in the distal ileum, no abscess demonstrated.  There is fluid in the pelvis and mesentery but this appears  unencapsulated without significant surrounding inflammatory change.    SHAKILA OJEDA MD

## 2020-03-26 NOTE — PLAN OF CARE
RN:  Patient cleared by MD and colorectal to discharge to home.  Patient tolerated low fiber diet.  Pain managed on oral pain meds.  No nuasea, emesis or diarrhea.  Patient with +BS and passing flatus.  Patient instructed to follow-up with his primary MD in one week.  He has been provided with colorectal phone number to schedule a colonoscopy in 6-8 weeks.  Patient up IND in the room.  No questions or concerns at time of discharge.  Patient read through AVS with bedside RN.  All belongings sent home with the patient.

## 2020-03-26 NOTE — PLAN OF CARE
Pt is A&Ox4, VSS on RA, IND, Voiding adequately, Advanced to Low fiber diet and tolerating well, pt reports of pain at 2, managed with prn Norco. Pt denies nausea, vomiting and diarrhea, reports no episode of stools today. Able to Pass Flatus, Ambulated in hallways, ABD is distended and tender with hypoactive auscultation. Possible discharge tomorrow if p.o. diet tolerated and pain managed well. Pt receiving Rocephin and Flagyl.

## 2020-03-26 NOTE — PROGRESS NOTES
[facility]  PROGRESS NOTE    March 26, 2020    SUBJECTIVE:  Feels well.  Passing flatus and stool.  Tolerating low fiber diet.  Has been ambulating.  Pain controlled.     OBJECTIVE:  Temp:  [95.9  F (35.5  C)-97  F (36.1  C)] 96.4  F (35.8  C)  Pulse:  [64-65] 65  Heart Rate:  [71-99] 71  Resp:  [16] 16  BP: (101-126)/(67-88) 126/88  SpO2:  [94 %-98 %] 94 %    Intake/Output Summary (Last 24 hours) at 3/26/2020 0836  Last data filed at 3/25/2020 1827  Gross per 24 hour   Intake 180 ml   Output --   Net 180 ml       GENERAL:  Awake, alert, no acute distress, resting in bed  HEAD: Nomocephalic atraumatic  RESPIRATORY: unlabored breathing on room air   EXTREMITIES: warm and well perfused  ABDOMEN:  Soft, minimally tender in LLQ, minimally distended    LABS:  Lab Results   Component Value Date    WBC 8.3 03/23/2020     Lab Results   Component Value Date    HGB 13.2 03/23/2020     Lab Results   Component Value Date    HCT 38.9 03/23/2020     Lab Results   Component Value Date     03/23/2020     Last Basic Metabolic Panel:  Lab Results   Component Value Date     03/24/2020      Lab Results   Component Value Date    POTASSIUM 3.7 03/24/2020     Lab Results   Component Value Date    CHLORIDE 103 03/24/2020     Lab Results   Component Value Date    GALDINO 8.5 03/24/2020     Lab Results   Component Value Date    CO2 21 03/24/2020     Lab Results   Component Value Date    BUN 9 03/24/2020     Lab Results   Component Value Date    CR 0.92 03/24/2020     Lab Results   Component Value Date    GLC 82 03/24/2020       ASSESSMENT/PLAN: 45 year old male admitted with acute, perforated diverticulitis. Abdominal exam improving. Leukocytosis resolved.   - transition to oral antibiotics for a 14 day total course   - ok to discharge home today  - recommend colonoscopy in 6-8 weeks; our office will contact him to set this up  - low fiber diet for 2 weeks then can return to a high fiber diet         For  questions/paging, please contact the CRS office at [phone].    Zoë Gordon PA-C  [facility]  Phone: [phone]

## 2020-03-26 NOTE — PLAN OF CARE
VSS. A/Ox4. Ind. Low fiber diet. Pain is rated a 2 consistently, controlled by PRN Norco. Abdomen is slightly distended but non-tender. Hypotonic bowel sounds. No stools this shift, pt is passing gas per pt. Receiving Rocephin and Flagyl. Pt may discharge today.

## 2020-05-13 DIAGNOSIS — Z11.59 ENCOUNTER FOR SCREENING FOR OTHER VIRAL DISEASES: Primary | ICD-10-CM

## 2020-05-16 ENCOUNTER — OFFICE VISIT (OUTPATIENT)
Dept: URGENT CARE | Facility: URGENT CARE | Age: 45
End: 2020-05-16
Payer: COMMERCIAL

## 2020-05-16 DIAGNOSIS — Z20.822 SUSPECTED COVID-19 VIRUS INFECTION: Primary | ICD-10-CM

## 2020-05-16 PROCEDURE — 87635 SARS-COV-2 COVID-19 AMP PRB: CPT | Mod: 90 | Performed by: FAMILY MEDICINE

## 2020-05-16 PROCEDURE — 99000 SPECIMEN HANDLING OFFICE-LAB: CPT | Performed by: FAMILY MEDICINE

## 2020-05-16 PROCEDURE — 99207 ZZC NO BILLABLE SERVICE THIS VISIT: CPT

## 2020-05-17 LAB
SARS-COV-2 RNA SPEC QL NAA+PROBE: NOT DETECTED
SPECIMEN SOURCE: NORMAL

## 2020-05-18 ENCOUNTER — HOSPITAL ENCOUNTER (OUTPATIENT)
Facility: CLINIC | Age: 45
Discharge: HOME | End: 2020-05-18
Attending: COLON & RECTAL SURGERY | Admitting: COLON & RECTAL SURGERY
Payer: COMMERCIAL

## 2020-05-18 VITALS
WEIGHT: 140 LBS | RESPIRATION RATE: 21 BRPM | BODY MASS INDEX: 22.5 KG/M2 | SYSTOLIC BLOOD PRESSURE: 118 MMHG | OXYGEN SATURATION: 98 % | HEART RATE: 69 BPM | DIASTOLIC BLOOD PRESSURE: 92 MMHG | HEIGHT: 66 IN

## 2020-05-18 LAB — COLONOSCOPY: NORMAL

## 2020-05-18 PROCEDURE — G0500 MOD SEDAT ENDO SERVICE >5YRS: HCPCS | Performed by: COLON & RECTAL SURGERY

## 2020-05-18 PROCEDURE — 45378 DIAGNOSTIC COLONOSCOPY: CPT | Performed by: COLON & RECTAL SURGERY

## 2020-05-18 PROCEDURE — 25000128 H RX IP 250 OP 636: Performed by: COLON & RECTAL SURGERY

## 2020-05-18 RX ORDER — PROCHLORPERAZINE MALEATE 10 MG
10 TABLET ORAL EVERY 6 HOURS PRN
Status: DISCONTINUED | OUTPATIENT
Start: 2020-05-18 | End: 2020-05-18 | Stop reason: HOSPADM

## 2020-05-18 RX ORDER — ONDANSETRON 4 MG/1
4 TABLET, ORALLY DISINTEGRATING ORAL EVERY 6 HOURS PRN
Status: DISCONTINUED | OUTPATIENT
Start: 2020-05-18 | End: 2020-05-18 | Stop reason: HOSPADM

## 2020-05-18 RX ORDER — LIDOCAINE 40 MG/G
CREAM TOPICAL
Status: DISCONTINUED | OUTPATIENT
Start: 2020-05-18 | End: 2020-05-18 | Stop reason: HOSPADM

## 2020-05-18 RX ORDER — FENTANYL CITRATE 50 UG/ML
INJECTION, SOLUTION INTRAMUSCULAR; INTRAVENOUS PRN
Status: DISCONTINUED | OUTPATIENT
Start: 2020-05-18 | End: 2020-05-18 | Stop reason: HOSPADM

## 2020-05-18 RX ORDER — ONDANSETRON 2 MG/ML
4 INJECTION INTRAMUSCULAR; INTRAVENOUS EVERY 6 HOURS PRN
Status: DISCONTINUED | OUTPATIENT
Start: 2020-05-18 | End: 2020-05-18 | Stop reason: HOSPADM

## 2020-05-18 RX ORDER — NALOXONE HYDROCHLORIDE 0.4 MG/ML
.1-.4 INJECTION, SOLUTION INTRAMUSCULAR; INTRAVENOUS; SUBCUTANEOUS
Status: DISCONTINUED | OUTPATIENT
Start: 2020-05-18 | End: 2020-05-18 | Stop reason: HOSPADM

## 2020-05-18 RX ORDER — ONDANSETRON 2 MG/ML
4 INJECTION INTRAMUSCULAR; INTRAVENOUS
Status: DISCONTINUED | OUTPATIENT
Start: 2020-05-18 | End: 2020-05-18 | Stop reason: HOSPADM

## 2020-05-18 RX ORDER — DIPHENHYDRAMINE HYDROCHLORIDE 50 MG/ML
25 INJECTION INTRAMUSCULAR; INTRAVENOUS EVERY 4 HOURS PRN
Status: DISCONTINUED | OUTPATIENT
Start: 2020-05-18 | End: 2020-05-18 | Stop reason: HOSPADM

## 2020-05-18 RX ORDER — DIPHENHYDRAMINE HCL 25 MG
25 CAPSULE ORAL EVERY 4 HOURS PRN
Status: DISCONTINUED | OUTPATIENT
Start: 2020-05-18 | End: 2020-05-18 | Stop reason: HOSPADM

## 2020-05-18 RX ORDER — FLUMAZENIL 0.1 MG/ML
0.2 INJECTION, SOLUTION INTRAVENOUS
Status: DISCONTINUED | OUTPATIENT
Start: 2020-05-18 | End: 2020-05-18 | Stop reason: HOSPADM

## 2020-05-18 ASSESSMENT — MIFFLIN-ST. JEOR: SCORE: 1467.79

## 2020-05-18 NOTE — H&P
"Pre-Endoscopy History and Physical     Elier Godinez MRN# [id number]   YOB: 1975 Age: 45 year old     Date of Procedure: 2020  Primary care provider: Pedro York  Type of Endoscopy: Colonoscopy  Reason for Procedure: F/U diverticulitis  Type of Anesthesia Anticipated: Moderate Sedation    HPI:    Elier is a 45 year old male who will be undergoing the above procedure.      A history and physical has been performed. The patient's medications and allergies have been reviewed. The risks and benefits of the procedure and the sedation options and risks were discussed with the patient.  All questions were answered and informed consent was obtained.      He denies a personal or family history of anesthesia complications or bleeding disorders.     No Known Allergies     No current facility-administered medications on file prior to encounter.   acetaminophen (TYLENOL) 325 MG tablet, Take 2 tablets (650 mg) by mouth every 4 hours as needed for mild pain  [] cefuroxime (CEFTIN) 500 MG tablet, Take 1 tablet (500 mg) by mouth 2 times daily for 7 days  HYDROcodone-acetaminophen (NORCO) 5-325 MG tablet, Take 1-2 tablets by mouth every 4 hours as needed  [] metroNIDAZOLE (FLAGYL) 500 MG tablet, Take 1 tablet (500 mg) by mouth 3 times daily for 7 days        Patient Active Problem List   Diagnosis     Diverticulitis     Small bowel obstruction (H)        No past medical history on file.     No past surgical history on file.    Social History     Tobacco Use     Smoking status: Not on file   Substance Use Topics     Alcohol use: Not on file       No family history on file.    REVIEW OF SYSTEMS:     5 point ROS negative except as noted above in HPI, including Gen., Resp., CV, GI &  system review.      PHYSICAL EXAM:   There were no vitals taken for this visit. Estimated body mass index is 22.21 kg/m  as calculated from the following:    Height as of 3/19/20: 1.676 m (5' 6\").    Weight as of " 3/19/20: 62.4 kg (137 lb 9.6 oz).   GENERAL APPEARANCE: healthy and alert  MENTAL STATUS: alert  AIRWAY EXAM: Mallampatti Class I (visualization of the soft palate, fauces, uvula, anterior and posterior pillars)  RESP: lungs clear to auscultation - no rales, rhonchi or wheezes  CV: regular rates and rhythm      IMPRESSION   ASA Class 2 - Mild systemic disease        PLAN:     Plan for colonoscopy. We discussed the risks, benefits and alternatives and the patient wished to proceed.    The above has been forwarded to the consulting provider.      Demetrice Gayle MD  [facility]  Phone: [phone]  Fax: [phone]  May 18, 2020

## 2020-05-18 NOTE — OP NOTE
See Provation Note In Chart    Demetrice Gayle MD  [facility]  Office Phone # [phone]

## 2021-05-31 VITALS — WEIGHT: 141.6 LBS

## 2021-06-25 NOTE — PROGRESS NOTES
Progress Notes by Shannon Alcantara DO at 6/15/2017  5:40 PM     Author: Shannon Alcantara DO Service: -- Author Type: Physician    Filed: 6/15/2017  7:48 PM Encounter Date: 6/15/2017 Status: Signed    : Shannon Alcantara DO (Physician)       Chief Complaint   Patient presents with   ? Shoulder Injury     playing sports, L/t x 3 weeks, hand is also numb      History of Present Illness:     Elier Godinez is a 41 y/o gentleman who presents tot he clinic concerned about shoulder pain that is worse with palpation , movement and when he reaches overhead.    He does not recall any particular injury, trauma or falls.  He denies any weakness with abduction or external rotation.  He reports his symptoms have been going on for 2-3 weeks and initially started after playing volleyball.  He reports that he is very active and plays volleyball and badminton routinely and often uses his arms.  He stated that he first noticed a change after he was unable to do push-ups due to significant show anterior shoulder pain.  Patient also now reports that he is having numbness going down his left arm.    Review of systems: See history of present illness, otherwise negative.   No current outpatient prescriptions on file.     No current facility-administered medications for this visit.       No past medical history on file.   No past surgical history on file.   Social History     Social History   ? Marital status: Single     Spouse name: N/A   ? Number of children: N/A   ? Years of education: N/A     Social History Main Topics   ? Smoking status: Former Smoker   ? Smokeless tobacco: None   ? Alcohol use None   ? Drug use: None   ? Sexual activity: Not Asked     Other Topics Concern   ? None     Social History Narrative   ? None      History   Smoking Status   ? Former Smoker   Smokeless Tobacco   ? Not on file      Physical Exam  Blood pressure 120/84, pulse 66, temperature 98.4  F (36.9  C), temperature source Oral, resp. rate 18,  weight 141 lb 9.6 oz (64.2 kg), SpO2 97 %.   General: Pleasant 43 y/o male  in NAD.  HEENT: Atraumatic, normocephalic, pupils equal, round, reactive to light. Conjunctivae pink. Sclerae anicteric. Tympanic membranes clear. Oropharynx clear.  NECK: No lymphadenopathy or thyromegaly or JVD.  Respiratory: Lungs are clear to auscultation and percussion.  CVS: Regular rate and rhythm without murmur, rub, or gallop.  MUSCULOSKELETAL: There is anterolateral tenderness on palpation of his left shoulder, Hawkin's test is positive.    Assessment/Plan   1. Acute pain of left shoulder  XR Humerus Left 2 VWS      X-ray negative for acute fracture or dislocation. Discussed with patient to rest, ice and NSAID's if symptoms persists will send for orthopedic evaluation.        Patient Instructions       Understanding a Rotator Cuff Tendon Tear    The rotator cuff is a group of 4 muscles and their tendons in the shoulder. The muscles are located in the front, back, and top of the shoulder joint. They each have a strong band of tissue (tendon) that attaches to the top of the upper arm bone. This helps keep the arm bone firmly in place in the socket of the shoulder joint. The muscles and tendons of the rotator cuff also help the shoulder joint with certain movements. These include reaching the arm over the head and rotating the arm.  Any one of the rotator cuff tendons can fray or tear from causes such as injury and overuse. A tear may be partial, with some of the tendon still intact. Or it may be a complete tear, with the tendon fully torn. Both types can cause pain and weakness, and limit arm and shoulder movement. A rotator cuff tear often needs treatment to heal properly.  Causes of a rotator cuff tendon tear  Causes can include:    Wear and tear of the tendons from aging or normal use over time    Overuse of the tendons from sports or work activities, especially those that involve repeated overhead movements    Injury to the  tendons from a fall or other accident  Symptoms of a rotator cuff tendon tear  Some people with a rotator cuff tendon tear have few or no symptoms. Others may have symptoms that range from mild to severe. Possible symptoms include:    Pain in your shoulder, which may be worse with overhead movements or at night from lying on the affected side    Weakness in your arm and shoulder    Trouble lifting your arm up or rotating your arm    Clicking or crackling sounds when moving or using your arm and shoulder  Treating a rotator cuff tendon tear  Treatment for a rotator cuff tendon tear depends on several factors. These include the severity of the tear and your symptoms. Options may include:    Resting your arm and shoulder. This involves limiting certain movements, such as reaching above your head or lifting your arm up. These can slow healing and worsen symptoms. You may also need to avoid certain sports and types of work for a time.    Cold packs or heat packs. These help reduce pain and swelling.    Prescription or over-the-counter pain medicines. These help reduce pain and swelling.    Injections of medicine into your shoulder. These help relieve pain and swelling for a time.    Physical therapy and exercises.  These help improve strength, flexibility, and range of motion in your arm and shoulder.     Surgery. You may need surgery if your tendon is completely torn or if other treatments dont relieve your symptoms. Different options are available. In many cases, the damaged tendon is repaired. It is then reattached to your arm bone.  Possible complications    If a partial tear isnt given time to heal, it may get larger or tear completely. You may then need more treatment.    Even with treatment, a partial or complete tear may sometimes have trouble healing. The problem may become long-term (chronic). This can cause ongoing pain, weakness, and limited movement of your arm and shoulder.     When to call your healthcare  provider  Call your healthcare provider right away if you have any of these:    Fever of 100.4 F (38 C) or higher, or as directed    Symptoms that dont get better with treatment, or get worse    After surgery, symptoms at the incision sites such as redness, warmth, swelling, bleeding, or drainage    New symptoms   Date Last Reviewed: 3/10/2016    4935-7780 [email or web address]. [address] [place] [address]. All rights reserved. This information is not intended as a substitute for professional medical care. Always follow your healthcare professional's instructions.           Shannon Alcantara, DO

## (undated) RX ORDER — FENTANYL CITRATE 50 UG/ML
INJECTION, SOLUTION INTRAMUSCULAR; INTRAVENOUS
Status: DISPENSED
Start: 2020-05-18